# Patient Record
Sex: FEMALE | Race: WHITE | NOT HISPANIC OR LATINO | Employment: OTHER | URBAN - METROPOLITAN AREA
[De-identification: names, ages, dates, MRNs, and addresses within clinical notes are randomized per-mention and may not be internally consistent; named-entity substitution may affect disease eponyms.]

---

## 2017-01-02 DIAGNOSIS — Z78.0 MENOPAUSE: ICD-10-CM

## 2017-01-02 DIAGNOSIS — M85.80 OSTEOPENIA: ICD-10-CM

## 2017-01-02 DIAGNOSIS — R53.83 FATIGUE: ICD-10-CM

## 2017-01-02 DIAGNOSIS — E03.9 HYPOTHYROIDISM: Primary | ICD-10-CM

## 2017-01-05 DIAGNOSIS — Z78.0 MENOPAUSE: ICD-10-CM

## 2017-01-05 DIAGNOSIS — E03.9 HYPOTHYROIDISM: ICD-10-CM

## 2017-01-05 DIAGNOSIS — M85.80 OSTEOPENIA: ICD-10-CM

## 2017-01-05 DIAGNOSIS — R53.83 FATIGUE: ICD-10-CM

## 2017-01-05 LAB
DEPRECATED CALCIDIOL+CALCIFEROL SERPL-MC: 56 UG/L (ref 20–75)
DHEA-S SERPL-MCNC: 372 UG/DL (ref 35–430)
ESTRADIOL SERPL-MCNC: 68 PG/ML
GLUCOSE SERPL-MCNC: 86 MG/DL (ref 70–99)
PROGEST SERPL-MCNC: 13.6 NG/ML
T3FREE SERPL-MCNC: 4.6 PG/ML (ref 2.3–4.2)
T4 FREE SERPL-MCNC: 1.02 NG/DL (ref 0.76–1.46)
TSH SERPL DL<=0.05 MIU/L-ACNC: 0.06 MU/L (ref 0.4–4)

## 2017-01-05 PROCEDURE — 84439 ASSAY OF FREE THYROXINE: CPT | Mod: 90 | Performed by: SURGERY

## 2017-01-05 PROCEDURE — 82947 ASSAY GLUCOSE BLOOD QUANT: CPT | Mod: 90 | Performed by: SURGERY

## 2017-01-05 PROCEDURE — 84144 ASSAY OF PROGESTERONE: CPT | Mod: 90 | Performed by: SURGERY

## 2017-01-05 PROCEDURE — 84270 ASSAY OF SEX HORMONE GLOBUL: CPT | Mod: 90 | Performed by: SURGERY

## 2017-01-05 PROCEDURE — 84403 ASSAY OF TOTAL TESTOSTERONE: CPT | Mod: 90 | Performed by: SURGERY

## 2017-01-05 PROCEDURE — 82306 VITAMIN D 25 HYDROXY: CPT | Mod: 90 | Performed by: SURGERY

## 2017-01-05 PROCEDURE — 82670 ASSAY OF TOTAL ESTRADIOL: CPT | Mod: 90 | Performed by: SURGERY

## 2017-01-05 PROCEDURE — 84443 ASSAY THYROID STIM HORMONE: CPT | Mod: 90 | Performed by: SURGERY

## 2017-01-05 PROCEDURE — 84481 FREE ASSAY (FT-3): CPT | Mod: 90 | Performed by: SURGERY

## 2017-01-05 PROCEDURE — 82627 DEHYDROEPIANDROSTERONE: CPT | Mod: 90 | Performed by: SURGERY

## 2017-01-05 PROCEDURE — 36415 COLL VENOUS BLD VENIPUNCTURE: CPT | Performed by: SURGERY

## 2017-01-05 PROCEDURE — 99000 SPECIMEN HANDLING OFFICE-LAB: CPT | Performed by: SURGERY

## 2017-01-08 LAB
SHBG SERPL-SCNC: 103 NMOL/L (ref 30–135)
TESTOST FREE SERPL-MCNC: 1.21 NG/DL (ref 0.06–0.38)
TESTOST SERPL-MCNC: 151 NG/DL (ref 8–60)

## 2017-02-18 DIAGNOSIS — Z78.0 MENOPAUSE: ICD-10-CM

## 2017-02-18 DIAGNOSIS — E03.9 HYPOTHYROIDISM: Primary | ICD-10-CM

## 2017-02-18 DIAGNOSIS — Q78.2 OSTEOPETROSIS: ICD-10-CM

## 2017-02-18 DIAGNOSIS — R53.83 FATIGUE: ICD-10-CM

## 2017-02-18 LAB
ESTRADIOL SERPL-MCNC: 174 PG/ML
PROGEST SERPL-MCNC: 23.5 NG/ML
T3FREE SERPL-MCNC: 4.8 PG/ML (ref 2.3–4.2)
T4 FREE SERPL-MCNC: 0.91 NG/DL (ref 0.76–1.46)
TSH SERPL DL<=0.05 MIU/L-ACNC: 0.16 MU/L (ref 0.4–4)

## 2017-02-18 PROCEDURE — 84403 ASSAY OF TOTAL TESTOSTERONE: CPT | Performed by: SURGERY

## 2017-02-18 PROCEDURE — 36415 COLL VENOUS BLD VENIPUNCTURE: CPT | Performed by: SURGERY

## 2017-02-18 PROCEDURE — 84144 ASSAY OF PROGESTERONE: CPT | Performed by: SURGERY

## 2017-02-18 PROCEDURE — 84481 FREE ASSAY (FT-3): CPT | Performed by: SURGERY

## 2017-02-18 PROCEDURE — 82627 DEHYDROEPIANDROSTERONE: CPT | Performed by: SURGERY

## 2017-02-18 PROCEDURE — 84270 ASSAY OF SEX HORMONE GLOBUL: CPT | Performed by: SURGERY

## 2017-02-18 PROCEDURE — 84443 ASSAY THYROID STIM HORMONE: CPT | Performed by: SURGERY

## 2017-02-18 PROCEDURE — 84439 ASSAY OF FREE THYROXINE: CPT | Performed by: SURGERY

## 2017-02-18 PROCEDURE — 82670 ASSAY OF TOTAL ESTRADIOL: CPT | Performed by: SURGERY

## 2017-02-20 LAB — DHEA-S SERPL-MCNC: 331 UG/DL (ref 35–430)

## 2017-02-21 LAB
SHBG SERPL-SCNC: 81 NMOL/L (ref 30–135)
TESTOST FREE SERPL-MCNC: 2.05 NG/DL (ref 0.06–0.38)
TESTOST SERPL-MCNC: 205 NG/DL (ref 8–60)

## 2017-03-29 ENCOUNTER — HOSPITAL ENCOUNTER (OUTPATIENT)
Dept: ULTRASOUND IMAGING | Facility: CLINIC | Age: 67
Discharge: HOME | End: 2017-03-29
Attending: SURGERY | Admitting: SURGERY
Payer: MEDICARE

## 2017-03-29 DIAGNOSIS — R10.11 ABDOMINAL WALL PAIN IN RIGHT UPPER QUADRANT: ICD-10-CM

## 2017-03-29 DIAGNOSIS — E03.9 HYPOTHYROIDISM: ICD-10-CM

## 2017-03-29 DIAGNOSIS — N95.1 MENOPAUSAL SYNDROME (HOT FLASHES): Primary | ICD-10-CM

## 2017-03-29 LAB
T3FREE SERPL-MCNC: 4.2 PG/ML (ref 2.3–4.2)
T4 FREE SERPL-MCNC: 0.98 NG/DL (ref 0.76–1.46)
TSH SERPL DL<=0.05 MIU/L-ACNC: 0.02 MU/L (ref 0.4–4)

## 2017-03-29 PROCEDURE — 84439 ASSAY OF FREE THYROXINE: CPT | Performed by: SURGERY

## 2017-03-29 PROCEDURE — 84270 ASSAY OF SEX HORMONE GLOBUL: CPT | Performed by: SURGERY

## 2017-03-29 PROCEDURE — 84443 ASSAY THYROID STIM HORMONE: CPT | Performed by: SURGERY

## 2017-03-29 PROCEDURE — 36415 COLL VENOUS BLD VENIPUNCTURE: CPT | Performed by: SURGERY

## 2017-03-29 PROCEDURE — 84481 FREE ASSAY (FT-3): CPT | Performed by: SURGERY

## 2017-03-29 PROCEDURE — 84403 ASSAY OF TOTAL TESTOSTERONE: CPT | Performed by: SURGERY

## 2017-03-29 PROCEDURE — 76705 ECHO EXAM OF ABDOMEN: CPT

## 2017-03-31 LAB
SHBG SERPL-SCNC: 75 NMOL/L (ref 30–135)
TESTOST FREE SERPL-MCNC: 0.2 NG/DL (ref 0.06–0.38)
TESTOST SERPL-MCNC: 22 NG/DL (ref 8–60)

## 2017-06-22 DIAGNOSIS — Q78.2 OSTEOPETROSIS: ICD-10-CM

## 2017-06-22 DIAGNOSIS — E03.9 HYPOTHYROIDISM: ICD-10-CM

## 2017-06-22 DIAGNOSIS — R53.83 FATIGUE: ICD-10-CM

## 2017-06-22 DIAGNOSIS — Z78.0 MENOPAUSE: ICD-10-CM

## 2017-06-22 DIAGNOSIS — R52 PAIN: Primary | ICD-10-CM

## 2017-06-22 LAB
CRP SERPL-MCNC: <2.9 MG/L (ref 0–8)
ERYTHROCYTE [SEDIMENTATION RATE] IN BLOOD BY WESTERGREN METHOD: 8 MM/H (ref 0–30)
GLUCOSE SERPL-MCNC: 91 MG/DL (ref 70–99)

## 2017-06-22 PROCEDURE — 85652 RBC SED RATE AUTOMATED: CPT | Performed by: SURGERY

## 2017-06-22 PROCEDURE — 84270 ASSAY OF SEX HORMONE GLOBUL: CPT | Performed by: SURGERY

## 2017-06-22 PROCEDURE — 84403 ASSAY OF TOTAL TESTOSTERONE: CPT | Performed by: SURGERY

## 2017-06-22 PROCEDURE — 82947 ASSAY GLUCOSE BLOOD QUANT: CPT | Performed by: SURGERY

## 2017-06-22 PROCEDURE — 36415 COLL VENOUS BLD VENIPUNCTURE: CPT | Performed by: SURGERY

## 2017-06-22 PROCEDURE — 86140 C-REACTIVE PROTEIN: CPT | Performed by: SURGERY

## 2017-06-28 LAB
SHBG SERPL-SCNC: 70 NMOL/L (ref 30–135)
TESTOST FREE SERPL-MCNC: 3.6 NG/DL (ref 0.06–0.38)
TESTOST SERPL-MCNC: 310 NG/DL (ref 8–60)

## 2017-08-08 DIAGNOSIS — Z78.0 MENOPAUSE: ICD-10-CM

## 2017-08-08 DIAGNOSIS — R52 PAIN MANAGEMENT: Primary | ICD-10-CM

## 2017-08-08 PROCEDURE — 36415 COLL VENOUS BLD VENIPUNCTURE: CPT | Performed by: SURGERY

## 2017-08-08 PROCEDURE — 84403 ASSAY OF TOTAL TESTOSTERONE: CPT | Performed by: SURGERY

## 2017-08-08 PROCEDURE — 84270 ASSAY OF SEX HORMONE GLOBUL: CPT | Performed by: SURGERY

## 2017-08-09 LAB
SHBG SERPL-SCNC: 57 NMOL/L (ref 30–135)
TESTOST FREE SERPL-MCNC: 0.38 NG/DL (ref 0.06–0.38)
TESTOST SERPL-MCNC: 31 NG/DL (ref 8–60)

## 2017-08-14 DIAGNOSIS — N95.1 MENOPAUSAL SYNDROME (HOT FLASHES): Primary | ICD-10-CM

## 2017-08-15 ENCOUNTER — OFFICE VISIT (OUTPATIENT)
Dept: OBGYN | Facility: CLINIC | Age: 67
End: 2017-08-15
Payer: COMMERCIAL

## 2017-08-15 VITALS
HEIGHT: 65 IN | WEIGHT: 150.2 LBS | DIASTOLIC BLOOD PRESSURE: 54 MMHG | HEART RATE: 86 BPM | SYSTOLIC BLOOD PRESSURE: 100 MMHG | BODY MASS INDEX: 25.02 KG/M2 | TEMPERATURE: 98.4 F

## 2017-08-15 DIAGNOSIS — Z00.00 ROUTINE GENERAL MEDICAL EXAMINATION AT A HEALTH CARE FACILITY: Primary | ICD-10-CM

## 2017-08-15 DIAGNOSIS — Z79.890 ON HORMONE REPLACEMENT THERAPY: ICD-10-CM

## 2017-08-15 DIAGNOSIS — Z12.31 ENCOUNTER FOR SCREENING MAMMOGRAM FOR BREAST CANCER: ICD-10-CM

## 2017-08-15 DIAGNOSIS — Z92.89 PERSONAL HISTORY OF OTHER MEDICAL TREATMENT: ICD-10-CM

## 2017-08-15 DIAGNOSIS — Z11.3 SCREEN FOR STD (SEXUALLY TRANSMITTED DISEASE): ICD-10-CM

## 2017-08-15 PROCEDURE — 99387 INIT PM E/M NEW PAT 65+ YRS: CPT | Performed by: OBSTETRICS & GYNECOLOGY

## 2017-08-15 PROCEDURE — G0145 SCR C/V CYTO,THINLAYER,RESCR: HCPCS | Performed by: OBSTETRICS & GYNECOLOGY

## 2017-08-15 PROCEDURE — 87491 CHLMYD TRACH DNA AMP PROBE: CPT | Performed by: OBSTETRICS & GYNECOLOGY

## 2017-08-15 PROCEDURE — G0476 HPV COMBO ASSAY CA SCREEN: HCPCS | Performed by: OBSTETRICS & GYNECOLOGY

## 2017-08-15 PROCEDURE — 87591 N.GONORRHOEAE DNA AMP PROB: CPT | Performed by: OBSTETRICS & GYNECOLOGY

## 2017-08-15 PROCEDURE — 99213 OFFICE O/P EST LOW 20 MIN: CPT | Mod: 25 | Performed by: OBSTETRICS & GYNECOLOGY

## 2017-08-15 NOTE — MR AVS SNAPSHOT
After Visit Summary   8/15/2017    Arun Aguilar    MRN: [id number]           Patient Information     Date Of Birth          1950        Visit Information        Provider Department      8/15/2017 2:15 PM Rcahele Jeff MD [facility]        Today's Diagnoses     Routine general medical examination at a health care facility    -  1    Screen for STD (sexually transmitted disease)        Encounter for screening mammogram for breast cancer        Personal history of other medical treatment         On hormone replacement therapy          Care Instructions      Preventive Health Recommendations  Female Ages 65 +    Yearly exam:     See your health care provider every year in order to  o Review health changes.   o Discuss preventive care.    o Review your medicines if your doctor has prescribed any.      You no longer need a yearly Pap test unless you've had an abnormal Pap test in the past 10 years. If you have vaginal symptoms, such as bleeding or discharge, be sure to talk with your provider about a Pap test.      Every 1 to 2 years, have a mammogram.  If you are over 69, talk with your health care provider about whether or not you want to continue having screening mammograms.      Every 10 years, have a colonoscopy. Or, have a yearly FIT test (stool test). These exams will check for colon cancer.       Have a cholesterol test every 5 years, or more often if your doctor advises it.       Have a diabetes test (fasting glucose) every three years. If you are at risk for diabetes, you should have this test more often.       At age 65, have a bone density scan (DEXA) to check for osteoporosis (brittle bone disease).    Shots:    Get a flu shot each year.    Get a tetanus shot every 10 years.    Talk to your doctor about your pneumonia vaccines. There are now two you should receive - Pneumovax (PPSV 23) and Prevnar (PCV 13).    Talk to your doctor about the shingles vaccine.    Talk  "to your doctor about the hepatitis B vaccine.    Nutrition:     Eat at least 5 servings of fruits and vegetables each day.      Eat whole-grain bread, whole-wheat pasta and brown rice instead of white grains and rice.      Talk to your provider about Calcium and Vitamin D.     Lifestyle    Exercise at least 150 minutes a week (30 minutes a day, 5 days a week). This will help you control your weight and prevent disease.      Limit alcohol to one drink per day.      No smoking.       Wear sunscreen to prevent skin cancer.       See your dentist twice a year for an exam and cleaning.      See your eye doctor every 1 to 2 years to screen for conditions such as glaucoma, macular degeneration, cataracts, etc           Follow-ups after your visit        Your next 10 appointments already scheduled     Sep 15, 2017 11:20 AM CDT   [facility] Vale Prescott [facility]-C   [facility]    [address] [address]   [phone]            Sep 15, 2017 12:30 PM CDT   MA SCREENING DIGITAL BILATERAL with [address] [facility] ([facility])    [address] [address]   [phone]           Do not use any powder, lotion or deodorant under your arms or on your breast. If you do, we will ask you to remove it before your exam.  Wear comfortable, two-piece clothing.  If you have any allergies, tell your care team.  Bring any previous mammograms from other facilities or have them mailed to the breast center. Three-dimensional (3D) mammograms are available at [facility] in [facility], and [place]. [facility] locations include [place] [facility] in [place]. Benefits of 3D mammograms include: - Improved rate of cancer detection - Decreases your chance of having to go back for more tests, which means fewer: - \"False-positive\" results (This means that there " "is an abnormal area but it isn't cancer.) - Invasive testing procedures, such as a biopsy or surgery - Can provide clearer images of the breast if you have dense breast tissue. 3D mammography is an optional exam that anyone can have with a 2D mammogram. It doesn't replace or take the place of a 2D mammogram. 2D mammograms remain an effective screening test for all women.  Not all insurance companies cover the cost of a 3D mammogram. Check with your insurance.              Who to contact     If you have questions or need follow up information about today's clinic visit or your schedule please contact [facility] directly at [phone].  Normal or non-critical lab and imaging results will be communicated to you by JumpSeathart, letter or phone within 4 business days after the clinic has received the results. If you do not hear from us within 7 days, please contact the clinic through JumpSeathart or phone. If you have a critical or abnormal lab result, we will notify you by phone as soon as possible.  Submit refill requests through MPOWER Mobile or call your pharmacy and they will forward the refill request to us. Please allow 3 business days for your refill to be completed.          Additional Information About Your Visit        [email or web address] Information     MPOWER Mobile lets you send messages to your doctor, view your test results, renew your prescriptions, schedule appointments and more. To sign up, go to [email or web address] . Click on \"Log in\" on the left side of the screen, which will take you to the Welcome page. Then click on \"Sign up Now\" on the right side of the page.     You will be asked to enter the access code listed below, as well as some personal information. Please follow the directions to create your username and password.     Your access code is: [email or web address]  Expires: 11/15/2017  3:54 PM     Your access code will  in 90 days. If you need help or a new code, please call your [facility] or " "[phone].        Care EveryWhere ID     This is your Care EveryWhere ID. This could be used by other organizations to access your [place] medical records  [id number]        Your Vitals Were     Pulse Temperature Height Breastfeeding? BMI (Body Mass Index)       86 98.4  F (36.9  C) (Oral) 5' 4.75\" (1.645 m) No 25.19 kg/m2        Blood Pressure from Last 3 Encounters:   08/15/17 100/54    Weight from Last 3 Encounters:   08/15/17 150 lb 3.2 oz (68.1 kg)              We Performed the Following     Chlamydia trachomatis PCR     HPV High Risk Types DNA Cervical     Neisseria gonorrhoeae PCR     Pap imaged thin layer screen with HPV - recommended age 30 - 65 years (select HPV order below)        Primary Care Provider Office Phone # Fax #    Zeus Lopez [phone]       [place] [address]        Equal Access to Services     TRISTAN PEDERSEN AH: Hadii elida ruano hadasho Soomaali, waaxda luqadaha, qaybta kaalmada adeegyada, waxay orlyin hayivannan bettie watt . So [facility] [phone].    ATENCIÓN: Si habla español, tiene a de jesus disposición servicios gratuitos de asistencia lingüística. Llame al [phone].    We comply with applicable federal civil rights laws and [place] laws. We do not discriminate on the basis of race, color, national origin, age, disability sex, sexual orientation or gender identity.            Thank you!     Thank you for choosing [facility]  for your care. Our goal is always to provide you with excellent care. Hearing back from our patients is one way we can continue to improve our services. Please take a few minutes to complete the written survey that you may receive in the mail after your visit with us. Thank you!             Your Updated Medication List - Protect others around you: Learn how to safely use, store and throw away your medicines at www.disposemymeds.org.      Notice  As of 8/15/2017 11:59 PM    You have not " been prescribed any medications.
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
## 2017-08-15 NOTE — LETTER
August 22, 2017      Arun Aguilar  [address] [place] [address]    Dear ,      I am happy to inform you that your cervical cancer screening test (PAP smear) was normal and your Human Papillomavirus (HPV) test was negative.    Per current guidelines, you no longer need to have pap smears completed.     Please continue to be seen every year for an annual wellness visit and other preventative tests.     Please contact my office at [phone] if you have further questions.    Sincerely,      Rachele Jeff MD/azalia

## 2017-08-16 LAB
C TRACH DNA SPEC QL NAA+PROBE: NEGATIVE
N GONORRHOEA DNA SPEC QL NAA+PROBE: NEGATIVE
SPECIMEN SOURCE: NORMAL
SPECIMEN SOURCE: NORMAL

## 2017-08-17 DIAGNOSIS — Z00.00 ROUTINE GENERAL MEDICAL EXAMINATION AT A HEALTH CARE FACILITY: Primary | ICD-10-CM

## 2017-08-17 LAB
COPATH REPORT: NORMAL
PAP: NORMAL

## 2017-08-17 PROCEDURE — 99207 ZZC NO CHARGE LOS: CPT | Performed by: OBSTETRICS & GYNECOLOGY

## 2017-08-17 RX ORDER — TYROSINE 500 MG
500 TABLET ORAL DAILY
COMMUNITY

## 2017-08-17 RX ORDER — VITAMIN K2 40 MCG
100 TABLET ORAL DAILY
COMMUNITY

## 2017-08-17 RX ORDER — ASCORBIC ACID 500 MG
500 TABLET ORAL DAILY
COMMUNITY

## 2017-08-17 RX ORDER — CRANBERRY FRUIT EXTRACT 650 MG
2 CAPSULE ORAL DAILY
COMMUNITY

## 2017-08-17 RX ORDER — VITAMIN B COMPLEX
50 TABLET ORAL DAILY
COMMUNITY

## 2017-08-17 RX ORDER — LANOLIN ALCOHOL/MO/W.PET/CERES
3 CREAM (GRAM) TOPICAL DAILY
COMMUNITY

## 2017-08-17 RX ORDER — PROGESTERONE, MICRONIZED 100 %
100 POWDER (GRAM) MISCELLANEOUS DAILY
COMMUNITY

## 2017-08-21 LAB
FINAL DIAGNOSIS: NORMAL
HPV HR 12 DNA CVX QL NAA+PROBE: NEGATIVE
HPV16 DNA SPEC QL NAA+PROBE: NEGATIVE
HPV18 DNA SPEC QL NAA+PROBE: NEGATIVE
SPECIMEN DESCRIPTION: NORMAL

## 2017-08-22 PROBLEM — Z12.4 CERVICAL CANCER SCREENING: Status: ACTIVE | Noted: 2017-08-22

## 2017-09-15 ENCOUNTER — OFFICE VISIT (OUTPATIENT)
Dept: DERMATOLOGY | Facility: CLINIC | Age: 67
End: 2017-09-15
Payer: COMMERCIAL

## 2017-09-15 ENCOUNTER — HOSPITAL ENCOUNTER (OUTPATIENT)
Dept: MAMMOGRAPHY | Facility: CLINIC | Age: 67
Discharge: HOME | End: 2017-09-15
Attending: OBSTETRICS & GYNECOLOGY | Admitting: OBSTETRICS & GYNECOLOGY
Payer: MEDICARE

## 2017-09-15 VITALS — OXYGEN SATURATION: 98 % | SYSTOLIC BLOOD PRESSURE: 139 MMHG | DIASTOLIC BLOOD PRESSURE: 55 MMHG | HEART RATE: 74 BPM

## 2017-09-15 DIAGNOSIS — Z12.31 ENCOUNTER FOR SCREENING MAMMOGRAM FOR BREAST CANCER: ICD-10-CM

## 2017-09-15 DIAGNOSIS — L82.1 SEBORRHEIC KERATOSIS: ICD-10-CM

## 2017-09-15 DIAGNOSIS — L72.9 CYST OF SKIN: Primary | ICD-10-CM

## 2017-09-15 DIAGNOSIS — L81.4 LENTIGO: ICD-10-CM

## 2017-09-15 DIAGNOSIS — D18.01 CHERRY ANGIOMA: ICD-10-CM

## 2017-09-15 DIAGNOSIS — L72.0 MILIA: ICD-10-CM

## 2017-09-15 DIAGNOSIS — L30.9 DERMATITIS: ICD-10-CM

## 2017-09-15 PROCEDURE — G0202 SCR MAMMO BI INCL CAD: HCPCS

## 2017-09-15 PROCEDURE — 99203 OFFICE O/P NEW LOW 30 MIN: CPT | Performed by: PHYSICIAN ASSISTANT

## 2017-09-15 RX ORDER — CLINDAMYCIN PHOSPHATE 10 MG/G
GEL TOPICAL 2 TIMES DAILY
Qty: 60 G | Refills: 11 | Status: SHIPPED | OUTPATIENT
Start: 2017-09-15

## 2017-09-15 NOTE — PROGRESS NOTES
Arun Aguilar is a 67 year old year old female patient here today for bumps on labia majora and skin check.  Patient reports that bumps have been present a few weeks after her hernia surgery. She denies any pain or bleeding. She also notes on inflamed cyst will come and go on her right buttock. She reports she also get a few different rashes. She notes a rash that will appear on her hands during the spring but then will resolve in a few weeks. She has a few healing areas on hands today.  She reports she will occasionally get a rash around her mouth which is clear today and reports that avoiding gluten has helped.  Patient has no other skin complaints today.  Remainder of the HPI, Meds, PMH, Allergies, FH, and SH was reviewed in chart.    Pertinent Hx:  No personal history of skin cancer   History reviewed. No pertinent past medical history.    History reviewed. No pertinent surgical history.     History reviewed. No pertinent family history.    Social History     Social History     Marital status: Single     Spouse name: N/A     Number of children: N/A     Years of education: N/A     Occupational History     Not on file.     Social History Main Topics     Smoking status: Not on file     Smokeless tobacco: Not on file     Alcohol use Not on file     Drug use: Not on file     Sexual activity: Not on file     Other Topics Concern     Not on file     Social History Narrative     No narrative on file       Outpatient Encounter Prescriptions as of 9/15/2017   Medication Sig Dispense Refill     clindamycin (CLINDAMAX) 1 % topical gel Apply topically 2 times daily 60 g 11     Calcium Carb-Ergocalciferol 500-200 MG-UNIT TABS Take 1 tablet by mouth daily       Fish Oil-Cholecalciferol (FISH OIL + D3) 0538-3708 MG-UNIT CAPS Take 500 mg by mouth daily       ascorbic acid (VITAMIN C) 500 MG tablet Take 500 mg by mouth daily       Cholecalciferol (VITAMIN D3) 1000 UNITS CAPS Take 1,000 Units by mouth daily       cyanocobalamin  (RA VITAMIN B-12 TR) 1000 MCG TBCR Take 1,000 mcg by mouth daily       Probiotic Product (PROBIOTIC & ACIDOPHILUS EX ST PO) Take 76 mg by mouth daily       Menaquinone-7 (VITAMIN K2) 40 MCG TABS Take 100 mcg by mouth daily       l-tyrosine 500 MG TABS Take 500 mg by mouth daily       B Complex Vitamins (VITAMIN-B COMPLEX) TABS Take 50 mg by mouth daily       SELENIUM PO Take 100 mcg by mouth daily       Progesterone Micronized (PROGESTERONE, BULK,) POWD powder Take 100 mg by mouth daily       progesterone (PROMETRIUM) 200 MG capsule Take 200 mg by mouth daily       DHEA 25 MG CAPS Take 2 mg by mouth daily       melatonin 3 MG tablet Take 3 mg by mouth daily       GLUCOSAMINE SULFATE PO Take 500 mg by mouth daily       No facility-administered encounter medications on file as of 9/15/2017.              Review Of Systems  Skin: As above  Eyes: negative  Ears/Nose/Throat: negative  Respiratory: No shortness of breath, dyspnea on exertion, cough, or hemoptysis  Cardiovascular: negative  Gastrointestinal: negative  Genitourinary: negative  Musculoskeletal: negative  Neurologic: negative  Psychiatric: negative  Hematologic/Lymphatic/Immunologic: negative  Endocrine: negative      O:   NAD, WDWN, Alert & Oriented, Mood & Affect wnl, Vitals stable   Here today alone   /55  Pulse 74  SpO2 98%   General appearance normal   Vitals stable   Alert, oriented and in no acute distress      White small papules < 2 mm in size on labia majora   Inflamed healing subcutaneous nodule on left buttock that is healing   Brown stuck on papules, brown macules and red papules on torso and extremities    No visible rash on face   Healing pink macules on right hand, fingers      The remainder of the detailed exam was unremarkable; the following areas were examined:  scalp/hair, conjunctiva/lids, face, neck, lips/teeth, oral mucosa/gingiva, chest, back, abdomen, buttocks, digits/nails, RUE, LUE, RLE, LLE.      Eyes:  Conjunctivae/lids:Normal     ENT: Lips    MSK:Normal    Cardiovascular: peripheral edema none    Pulm: Breathing Normal    Neuro/Psych: Orientation:Normal; Mood/Affect:Normal  A/P:  1. Milia on Labia Majora  Discussed can use an exfoliating wash to see if this will help prevent further areas from developing.   Informational handout was given.  2. Healing inflamed cyst on left buttock  Patient reports that she occasionally will get out drainage.  Can try clindamycin gel. Use warm compresses.   3. Dermatitis on hand and mouth  Please return when rash is present for further evaluation and possibly biopsy.  4. Seborrheic keratosis, lentigo, cherry angioma   BENIGN LESIONS DISCUSSED WITH PATIENT:  I discussed the specifics of tumor, prognosis, and genetics of benign lesions.  I explained that treatment of these lesions would be purely cosmetic and not medically neccessary.  I discussed with patient different removal options including excision, cautery and /or laser.      Nature and genetics of benign skin lesions dicussed with patient.  Signs and Symptoms of skin cancer discussed with patient.  ABCDEs of melanoma reviewed with patient.  Patient encouraged to perform monthly skin exams.  UV precautions reviewed with patient.  Risks of non-melanoma skin cancer discussed with patient   Return to clinic one year or sooner if needed.

## 2017-09-15 NOTE — MR AVS SNAPSHOT
"              After Visit Summary   9/15/2017    Arun Aguilar    MRN: [id number]           Patient Information     Date Of Birth          1950        Visit Information        Provider Department      9/15/2017 11:20 AM Vale Prescott PA-C [facility]        Today's Diagnoses     Cyst of skin    -  1    Milia        Lentigo        Seborrheic keratosis        Cherry angioma        Dermatitis           Follow-ups after your visit        Who to contact     If you have questions or need follow up information about today's clinic visit or your schedule please contact [facility] directly at [phone].  Normal or non-critical lab and imaging results will be communicated to you by "[employer]hart, letter or phone within 4 business days after the clinic has received the results. If you do not hear from us within 7 days, please contact the clinic through "[employer]hart or phone. If you have a critical or abnormal lab result, we will notify you by phone as soon as possible.  Submit refill requests through [employer] or call your pharmacy and they will forward the refill request to us. Please allow 3 business days for your refill to be completed.          Additional Information About Your Visit        MyChart Information     [employer] lets you send messages to your doctor, view your test results, renew your prescriptions, schedule appointments and more. To sign up, go to [email or web address] . Click on \"Log in\" on the left side of the screen, which will take you to the Welcome page. Then click on \"Sign up Now\" on the right side of the page.     You will be asked to enter the access code listed below, as well as some personal information. Please follow the directions to create your username and password.     Your access code is: [email or web address]  Expires: 11/15/2017  3:54 PM     Your access code will  in 90 days. If you need help or a new code, please call your [facility] or [phone].   "      Care EveryWhere ID     This is your Care EveryWhere ID. This could be used by other organizations to access your [place] [facility] medical records  [id number]        Your Vitals Were     Pulse Pulse Oximetry                74 98%           Blood Pressure from Last 3 Encounters:   09/15/17 139/55   08/15/17 100/54    Weight from Last 3 Encounters:   08/15/17 68.1 kg (150 lb 3.2 oz)              Today, you had the following     No orders found for display         Today's Medication Changes          These changes are accurate as of: 9/15/17 11:59 PM.  If you have any questions, ask your nurse or doctor.               Start taking these medicines.        Dose/Directions    clindamycin 1 % topical gel   Commonly known as:  CLINDAMAX   Used for:  Cyst of skin   Started by:  Vale Prescott PA-C        Apply topically 2 times daily   Quantity:  60 g   Refills:  11            Where to get your medicines      These medications were sent to [facility] - [place], [place] - [address]  [address] , [facility] [address]     Phone:  [phone]     clindamycin 1 % topical gel                Primary Care Provider Office Phone # Fax #    Zeus Lopez [phone]       [place] [address]        Equal Access to Services     TRISTAN PEDERSEN AH: Hadii elida ruano hadasho Soomaali, waaxda luqadaha, qaybta kaalmada adeegyada, waxruslan urena. So [facility] [phone].    ATENCIÓN: Si habla español, tiene a de jesus disposición servicios gratuitos de asistencia lingüística. Llame al [phone].    We comply with applicable federal civil rights laws and [place] laws. We do not discriminate on the basis of race, color, national origin, age, disability sex, sexual orientation or gender identity.            Thank you!     Thank you for choosing [facility]  for your care. Our goal is always to provide you with excellent care. Hearing  back from our patients is one way we can continue to improve our services. Please take a few minutes to complete the written survey that you may receive in the mail after your visit with us. Thank you!             Your Updated Medication List - Protect others around you: Learn how to safely use, store and throw away your medicines at www.disposemymeds.org.          This list is accurate as of: 9/15/17 11:59 PM.  Always use your most recent med list.                   Brand Name Dispense Instructions for use Diagnosis    ascorbic acid 500 MG tablet    VITAMIN C     Take 500 mg by mouth daily        Calcium Carb-Ergocalciferol 500-200 MG-UNIT Tabs      Take 1 tablet by mouth daily        clindamycin 1 % topical gel    CLINDAMAX    60 g    Apply topically 2 times daily    Cyst of skin       DHEA 25 MG Caps      Take 2 mg by mouth daily        FISH OIL + D3 2063-8799 MG-UNIT Caps      Take 500 mg by mouth daily        GLUCOSAMINE SULFATE PO      Take 500 mg by mouth daily        l-tyrosine 500 MG Tabs      Take 500 mg by mouth daily        melatonin 3 MG tablet      Take 3 mg by mouth daily        PROBIOTIC & ACIDOPHILUS EX ST PO      Take 76 mg by mouth daily        progesterone (bulk) Powd powder      Take 100 mg by mouth daily        progesterone 200 MG capsule    PROMETRIUM     Take 200 mg by mouth daily        RA VITAMIN B-12 TR 1000 MCG Tbcr   Generic drug:  cyanocobalamin      Take 1,000 mcg by mouth daily        SELENIUM PO      Take 100 mcg by mouth daily        vitamin D3 1000 UNITS Caps      Take 1,000 Units by mouth daily        Vitamin K2 40 MCG Tabs      Take 100 mcg by mouth daily        Vitamin-B Complex Tabs      Take 50 mg by mouth daily

## 2017-09-15 NOTE — NURSING NOTE
"Initial /55  Pulse 74  SpO2 98% Estimated body mass index is 25.19 kg/(m^2) as calculated from the following:    Height as of 8/15/17: 1.645 m (5' 4.75\").    Weight as of 8/15/17: 68.1 kg (150 lb 3.2 oz). .      "

## 2017-09-25 DIAGNOSIS — R52 PAIN MANAGEMENT: ICD-10-CM

## 2017-09-25 DIAGNOSIS — R53.83 FATIGUE: ICD-10-CM

## 2017-09-25 DIAGNOSIS — Z78.0 MENOPAUSE: ICD-10-CM

## 2017-09-25 DIAGNOSIS — E03.9 HYPOTHYROIDISM: ICD-10-CM

## 2017-09-25 DIAGNOSIS — E03.9 HYPOTHYROIDISM: Primary | ICD-10-CM

## 2017-09-25 LAB
CORTIS SERPL-MCNC: 21.1 UG/DL (ref 4–22)
CORTIS SERPL-MCNC: 7.1 UG/DL (ref 4–22)
CRP SERPL-MCNC: <2.9 MG/L (ref 0–8)
DHEA-S SERPL-MCNC: 204 UG/DL (ref 35–430)
ERYTHROCYTE [SEDIMENTATION RATE] IN BLOOD BY WESTERGREN METHOD: 5 MM/H (ref 0–30)
ESTRADIOL SERPL-MCNC: 32 PG/ML
PROGEST SERPL-MCNC: 54.6 NG/ML
T3FREE SERPL-MCNC: 5.3 PG/ML (ref 2.3–4.2)
T4 FREE SERPL-MCNC: 0.92 NG/DL (ref 0.76–1.46)
TSH SERPL DL<=0.005 MIU/L-ACNC: 0.29 MU/L (ref 0.4–4)

## 2017-09-25 PROCEDURE — 84443 ASSAY THYROID STIM HORMONE: CPT | Performed by: SURGERY

## 2017-09-25 PROCEDURE — 82533 TOTAL CORTISOL: CPT | Mod: 59 | Performed by: SURGERY

## 2017-09-25 PROCEDURE — 84481 FREE ASSAY (FT-3): CPT | Performed by: SURGERY

## 2017-09-25 PROCEDURE — 86140 C-REACTIVE PROTEIN: CPT | Performed by: SURGERY

## 2017-09-25 PROCEDURE — 36415 COLL VENOUS BLD VENIPUNCTURE: CPT | Performed by: SURGERY

## 2017-09-25 PROCEDURE — 82627 DEHYDROEPIANDROSTERONE: CPT | Performed by: SURGERY

## 2017-09-25 PROCEDURE — 84144 ASSAY OF PROGESTERONE: CPT | Performed by: SURGERY

## 2017-09-25 PROCEDURE — 84439 ASSAY OF FREE THYROXINE: CPT | Performed by: SURGERY

## 2017-09-25 PROCEDURE — 82670 ASSAY OF TOTAL ESTRADIOL: CPT | Performed by: SURGERY

## 2017-09-25 PROCEDURE — 84270 ASSAY OF SEX HORMONE GLOBUL: CPT | Performed by: SURGERY

## 2017-09-25 PROCEDURE — 82533 TOTAL CORTISOL: CPT | Performed by: SURGERY

## 2017-09-25 PROCEDURE — 85652 RBC SED RATE AUTOMATED: CPT | Performed by: SURGERY

## 2017-09-25 PROCEDURE — 84403 ASSAY OF TOTAL TESTOSTERONE: CPT | Performed by: SURGERY

## 2017-09-27 LAB
SHBG SERPL-SCNC: 47 NMOL/L (ref 30–135)
TESTOST FREE SERPL-MCNC: 1.69 NG/DL (ref 0.06–0.38)
TESTOST SERPL-MCNC: 115 NG/DL (ref 8–60)

## 2017-11-06 DIAGNOSIS — N95.1 MENOPAUSAL SYNDROME (HOT FLASHES): ICD-10-CM

## 2017-11-06 PROCEDURE — 36415 COLL VENOUS BLD VENIPUNCTURE: CPT | Performed by: SURGERY

## 2017-11-06 PROCEDURE — 84144 ASSAY OF PROGESTERONE: CPT | Performed by: SURGERY

## 2017-11-07 LAB — PROGEST SERPL-MCNC: 9.1 NG/ML

## 2017-12-22 ENCOUNTER — TELEPHONE (OUTPATIENT)
Dept: OBGYN | Facility: CLINIC | Age: 67
End: 2017-12-22

## 2017-12-22 NOTE — TELEPHONE ENCOUNTER
"12/22/2017        Patient Communication Preferences indicate  Do not contact  and/or communication by \"Phone\" is not preferred. Call not required per Outreach team.      Outreach ,  Denny Dubose         "

## 2018-08-30 ENCOUNTER — TRANSFERRED RECORDS (OUTPATIENT)
Dept: HEALTH INFORMATION MANAGEMENT | Facility: CLINIC | Age: 68
End: 2018-08-30

## 2018-08-30 ENCOUNTER — MEDICAL CORRESPONDENCE (OUTPATIENT)
Dept: HEALTH INFORMATION MANAGEMENT | Facility: CLINIC | Age: 68
End: 2018-08-30

## 2018-09-11 ENCOUNTER — MEDICAL CORRESPONDENCE (OUTPATIENT)
Dept: HEALTH INFORMATION MANAGEMENT | Facility: CLINIC | Age: 68
End: 2018-09-11

## 2018-10-22 ENCOUNTER — HOSPITAL ENCOUNTER (OUTPATIENT)
Dept: BONE DENSITY | Facility: CLINIC | Age: 68
Discharge: HOME | End: 2018-10-22
Attending: SURGERY | Admitting: SURGERY
Payer: MEDICARE

## 2018-10-22 DIAGNOSIS — Z13.820 SCREENING FOR OSTEOPOROSIS: ICD-10-CM

## 2018-10-22 DIAGNOSIS — M85.88 OTHER SPECIFIED DISORDERS OF BONE DENSITY AND STRUCTURE, OTHER SITE: ICD-10-CM

## 2018-10-22 PROCEDURE — 77080 DXA BONE DENSITY AXIAL: CPT

## 2020-11-29 ENCOUNTER — RECORDS - HEALTHEAST (OUTPATIENT)
Dept: ADMINISTRATIVE | Facility: OTHER | Age: 70
End: 2020-11-29

## 2021-01-14 ENCOUNTER — HEALTH MAINTENANCE LETTER (OUTPATIENT)
Age: 71
End: 2021-01-14

## 2021-10-24 ENCOUNTER — HEALTH MAINTENANCE LETTER (OUTPATIENT)
Age: 71
End: 2021-10-24

## 2022-02-13 ENCOUNTER — HEALTH MAINTENANCE LETTER (OUTPATIENT)
Age: 72
End: 2022-02-13

## 2022-10-15 ENCOUNTER — HEALTH MAINTENANCE LETTER (OUTPATIENT)
Age: 72
End: 2022-10-15

## 2023-03-26 ENCOUNTER — HEALTH MAINTENANCE LETTER (OUTPATIENT)
Age: 73
End: 2023-03-26

## 2023-08-11 ENCOUNTER — HOSPITAL ENCOUNTER (OUTPATIENT)
Dept: BONE DENSITY | Facility: CLINIC | Age: 73
Discharge: HOME | End: 2023-08-11
Attending: SURGERY | Admitting: SURGERY
Payer: MEDICARE

## 2023-08-11 DIAGNOSIS — M85.88 OTHER SPECIFIED DISORDERS OF BONE DENSITY AND STRUCTURE, OTHER SITE: ICD-10-CM

## 2023-08-11 DIAGNOSIS — M85.80 OTHER SPECIFIED DISORDERS OF BONE DENSITY AND STRUCTURE, UNSPECIFIED SITE: ICD-10-CM

## 2023-08-11 PROCEDURE — 77080 DXA BONE DENSITY AXIAL: CPT

## 2023-08-17 ENCOUNTER — MEDICAL CORRESPONDENCE (OUTPATIENT)
Dept: HEALTH INFORMATION MANAGEMENT | Facility: CLINIC | Age: 73
End: 2023-08-17
Payer: MEDICARE

## 2023-08-17 ENCOUNTER — TRANSFERRED RECORDS (OUTPATIENT)
Dept: HEALTH INFORMATION MANAGEMENT | Facility: CLINIC | Age: 73
End: 2023-08-17
Payer: MEDICARE

## 2023-10-02 ENCOUNTER — OFFICE VISIT (OUTPATIENT)
Dept: DERMATOLOGY | Facility: CLINIC | Age: 73
End: 2023-10-02
Payer: MEDICARE

## 2023-10-02 DIAGNOSIS — D18.01 ANGIOMA OF SKIN: ICD-10-CM

## 2023-10-02 DIAGNOSIS — D23.9 DERMAL NEVUS: ICD-10-CM

## 2023-10-02 DIAGNOSIS — L82.1 SEBORRHEIC KERATOSES: Primary | ICD-10-CM

## 2023-10-02 DIAGNOSIS — L72.0 EPIDERMAL CYST: ICD-10-CM

## 2023-10-02 DIAGNOSIS — L81.4 LENTIGO: ICD-10-CM

## 2023-10-02 PROCEDURE — 99203 OFFICE O/P NEW LOW 30 MIN: CPT | Performed by: DERMATOLOGY

## 2023-10-02 NOTE — LETTER
10/2/2023         RE: Arun Aguilar  [address] [place] [address]        Dear Colleague,    Thank you for referring your patient, Arun Aguilar, to the [facility]. Please see a copy of my visit note below.    Arun Aguilar is an extremely pleasant 73 year old year old female patient I was asked to see by Dr. Lopez for spots on skin and face.  Patient has no other skin complaints today.  Remainder of the HPI, Meds, PMH, Allergies, FH, and SH was reviewed in chart.    No past medical history on file.    No past surgical history on file.     Family History   Problem Relation Age of Onset     No Known Problems Mother      No Known Problems Father      No Known Problems Sister      No Known Problems Daughter      No Known Problems Maternal Grandmother      No Known Problems Maternal Grandfather      No Known Problems Paternal Grandmother      No Known Problems Paternal Grandfather      No Known Problems Maternal Aunt      No Known Problems Paternal Aunt      Hereditary Breast and Ovarian Cancer Syndrome No family hx of      Breast Cancer No family hx of      Cancer No family hx of      Colon Cancer No family hx of      Endometrial Cancer No family hx of      Ovarian Cancer No family hx of        Social History     Socioeconomic History     Marital status: Single     Spouse name: Not on file     Number of children: Not on file     Years of education: Not on file     Highest education level: Not on file   Occupational History     Not on file   Tobacco Use     Smoking status: Not on file     Smokeless tobacco: Not on file   Substance and Sexual Activity     Alcohol use: Not on file     Drug use: Not on file     Sexual activity: Not on file   Other Topics Concern     Not on file   Social History Narrative     Not on file     Social Determinants of Health     Financial Resource Strain: Not on file   Food Insecurity: Not on file   Transportation Needs: Not on file   Physical  Activity: Not on file   Stress: Not on file   Social Connections: Not on file   Interpersonal Safety: Not on file   Housing Stability: Not on file       Outpatient Encounter Medications as of 10/2/2023   Medication Sig Dispense Refill     ascorbic acid (VITAMIN C) 500 MG tablet Take 500 mg by mouth daily       B Complex Vitamins (VITAMIN-B COMPLEX) TABS Take 50 mg by mouth daily       Calcium Carb-Ergocalciferol 500-200 MG-UNIT TABS Take 1 tablet by mouth daily       Cholecalciferol (VITAMIN D3) 1000 UNITS CAPS Take 1,000 Units by mouth daily       clindamycin (CLINDAMAX) 1 % topical gel Apply topically 2 times daily 60 g 11     cyanocobalamin (RA VITAMIN B-12 TR) 1000 MCG TBCR Take 1,000 mcg by mouth daily       DHEA 25 MG CAPS Take 2 mg by mouth daily       Fish Oil-Cholecalciferol (FISH OIL + D3) 1125-4067 MG-UNIT CAPS Take 500 mg by mouth daily       GLUCOSAMINE SULFATE PO Take 500 mg by mouth daily       l-tyrosine 500 MG TABS Take 500 mg by mouth daily       melatonin 3 MG tablet Take 3 mg by mouth daily       Menaquinone-7 (VITAMIN K2) 40 MCG TABS Take 100 mcg by mouth daily       Probiotic Product (PROBIOTIC & ACIDOPHILUS EX ST PO) Take 76 mg by mouth daily       progesterone (PROMETRIUM) 200 MG capsule Take 200 mg by mouth daily       Progesterone Micronized (PROGESTERONE, BULK,) POWD powder Take 100 mg by mouth daily       SELENIUM PO Take 100 mcg by mouth daily       No facility-administered encounter medications on file as of 10/2/2023.             O:   NAD, WDWN, Alert & Oriented, Mood & Affect wnl, Vitals stable   Here today alone   General appearance normal   Vitals stable   Alert, oriented and in no acute distress      Following lymph nodes palpated: Occipital, Cervical, Supraclavicular no lad  R temple firm nodule      Stuck on papules and brown macules on trunk and ext   Red papules on trunk  Flesh colored papules on trunk     The remainder of the full exam was normal; the following areas were  examined:  conjunctiva/lids, , neck, peripheral vascular system, abdomen, lymph nodes, digits/nails, eccrine and apocrine glands, scalp/hair, face, neck, chest, abdomen, buttocks, back, RUE, LUE, RLE, LLE       Eyes: Conjunctivae/lids:Normal     ENT: Lips, buccal mucosa, tongue: normal    MSK:Normal    Cardiovascular: peripheral edema none    Pulm: Breathing Normal    Lymph Nodes: No Head and Neck Lymphadenopathy     Neuro/Psych: Orientation:Alert and Orientedx3 ; Mood/Affect:normal       A/P:  1. Seborrheic keratosis, lentigo, angioma, dermal nevus, cyst  Excision discussed with patient   It was a pleasure speaking to Arun Aguilar today.  Previous clinic notes and pertinent laboratory tests were reviewed prior to Arun Aguilar's visit.  Nature and genetics of benign skin lesions dicussed with patient.  Signs and Symptoms of skin cancer discussed with patient.  Patient encouraged to perform monthly skin exams.  UV precautions reviewed with patient.  Risks of non-melanoma skin cancer discussed with patient   Return to clinic 12 months      Again, thank you for allowing me to participate in the care of your patient.        Sincerely,        Duke Bagley MD

## 2023-10-02 NOTE — PATIENT INSTRUCTIONS
-You have a cyst. It can fill with fluid and become bigger and then get smaller. It can continue to to do this. If you wish to have this removed it would be an excision with Dr. Bagley.    You have benign spots called Seborrheic Keratosis and cherry angiomas scattered on your body.

## 2023-10-02 NOTE — PROGRESS NOTES
Arun Aguilar is an extremely pleasant 73 year old year old female patient I was asked to see by Dr. Lopez for spots on skin and face.  Patient has no other skin complaints today.  Remainder of the HPI, Meds, PMH, Allergies, FH, and SH was reviewed in chart.    No past medical history on file.    No past surgical history on file.     Family History   Problem Relation Age of Onset    No Known Problems Mother     No Known Problems Father     No Known Problems Sister     No Known Problems Daughter     No Known Problems Maternal Grandmother     No Known Problems Maternal Grandfather     No Known Problems Paternal Grandmother     No Known Problems Paternal Grandfather     No Known Problems Maternal Aunt     No Known Problems Paternal Aunt     Hereditary Breast and Ovarian Cancer Syndrome No family hx of     Breast Cancer No family hx of     Cancer No family hx of     Colon Cancer No family hx of     Endometrial Cancer No family hx of     Ovarian Cancer No family hx of        Social History     Socioeconomic History    Marital status: Single     Spouse name: Not on file    Number of children: Not on file    Years of education: Not on file    Highest education level: Not on file   Occupational History    Not on file   Tobacco Use    Smoking status: Not on file    Smokeless tobacco: Not on file   Substance and Sexual Activity    Alcohol use: Not on file    Drug use: Not on file    Sexual activity: Not on file   Other Topics Concern    Not on file   Social History Narrative    Not on file     Social Determinants of Health     Financial Resource Strain: Not on file   Food Insecurity: Not on file   Transportation Needs: Not on file   Physical Activity: Not on file   Stress: Not on file   Social Connections: Not on file   Interpersonal Safety: Not on file   Housing Stability: Not on file       Outpatient Encounter Medications as of 10/2/2023   Medication Sig Dispense Refill    ascorbic acid (VITAMIN C) 500 MG tablet Take 500  mg by mouth daily      B Complex Vitamins (VITAMIN-B COMPLEX) TABS Take 50 mg by mouth daily      Calcium Carb-Ergocalciferol 500-200 MG-UNIT TABS Take 1 tablet by mouth daily      Cholecalciferol (VITAMIN D3) 1000 UNITS CAPS Take 1,000 Units by mouth daily      clindamycin (CLINDAMAX) 1 % topical gel Apply topically 2 times daily 60 g 11    cyanocobalamin (RA VITAMIN B-12 TR) 1000 MCG TBCR Take 1,000 mcg by mouth daily      DHEA 25 MG CAPS Take 2 mg by mouth daily      Fish Oil-Cholecalciferol (FISH OIL + D3) 5774-4163 MG-UNIT CAPS Take 500 mg by mouth daily      GLUCOSAMINE SULFATE PO Take 500 mg by mouth daily      l-tyrosine 500 MG TABS Take 500 mg by mouth daily      melatonin 3 MG tablet Take 3 mg by mouth daily      Menaquinone-7 (VITAMIN K2) 40 MCG TABS Take 100 mcg by mouth daily      Probiotic Product (PROBIOTIC & ACIDOPHILUS EX ST PO) Take 76 mg by mouth daily      progesterone (PROMETRIUM) 200 MG capsule Take 200 mg by mouth daily      Progesterone Micronized (PROGESTERONE, BULK,) POWD powder Take 100 mg by mouth daily      SELENIUM PO Take 100 mcg by mouth daily       No facility-administered encounter medications on file as of 10/2/2023.             O:   NAD, WDWN, Alert & Oriented, Mood & Affect wnl, Vitals stable   Here today alone   General appearance normal   Vitals stable   Alert, oriented and in no acute distress      Following lymph nodes palpated: Occipital, Cervical, Supraclavicular no lad  R temple firm nodule      Stuck on papules and brown macules on trunk and ext   Red papules on trunk  Flesh colored papules on trunk     The remainder of the full exam was normal; the following areas were examined:  conjunctiva/lids, , neck, peripheral vascular system, abdomen, lymph nodes, digits/nails, eccrine and apocrine glands, scalp/hair, face, neck, chest, abdomen, buttocks, back, RUE, LUE, RLE, LLE       Eyes: Conjunctivae/lids:Normal     ENT: Lips, buccal mucosa, tongue:  normal    MSK:Normal    Cardiovascular: peripheral edema none    Pulm: Breathing Normal    Lymph Nodes: No Head and Neck Lymphadenopathy     Neuro/Psych: Orientation:Alert and Orientedx3 ; Mood/Affect:normal       A/P:  1. Seborrheic keratosis, lentigo, angioma, dermal nevus, cyst  Excision discussed with patient   It was a pleasure speaking to Arun Aguilar today.  Previous clinic notes and pertinent laboratory tests were reviewed prior to Arun Aguilar's visit.  Nature and genetics of benign skin lesions dicussed with patient.  Signs and Symptoms of skin cancer discussed with patient.  Patient encouraged to perform monthly skin exams.  UV precautions reviewed with patient.  Risks of non-melanoma skin cancer discussed with patient   Return to clinic 12 months

## 2023-10-05 ENCOUNTER — TELEPHONE (OUTPATIENT)
Dept: OBGYN | Facility: CLINIC | Age: 73
End: 2023-10-05
Payer: MEDICARE

## 2023-10-16 ENCOUNTER — TELEPHONE (OUTPATIENT)
Dept: OBGYN | Facility: CLINIC | Age: 73
End: 2023-10-16

## 2023-10-16 ENCOUNTER — OFFICE VISIT (OUTPATIENT)
Dept: OBGYN | Facility: CLINIC | Age: 73
End: 2023-10-16
Payer: MEDICARE

## 2023-10-16 VITALS
WEIGHT: 143 LBS | HEIGHT: 66 IN | BODY MASS INDEX: 22.98 KG/M2 | RESPIRATION RATE: 18 BRPM | SYSTOLIC BLOOD PRESSURE: 150 MMHG | DIASTOLIC BLOOD PRESSURE: 69 MMHG | TEMPERATURE: 98.5 F | HEART RATE: 84 BPM

## 2023-10-16 DIAGNOSIS — Z12.4 CERVICAL CANCER SCREENING: ICD-10-CM

## 2023-10-16 DIAGNOSIS — Z71.89 COUNSELING FOR HORMONE REPLACEMENT THERAPY: ICD-10-CM

## 2023-10-16 DIAGNOSIS — F52.9 SEXUAL AROUSAL DISORDER: ICD-10-CM

## 2023-10-16 DIAGNOSIS — N39.41 URGE INCONTINENCE OF URINE: Primary | ICD-10-CM

## 2023-10-16 PROCEDURE — G0145 SCR C/V CYTO,THINLAYER,RESCR: HCPCS | Performed by: OBSTETRICS & GYNECOLOGY

## 2023-10-16 PROCEDURE — 87624 HPV HI-RISK TYP POOLED RSLT: CPT | Mod: GY | Performed by: OBSTETRICS & GYNECOLOGY

## 2023-10-16 PROCEDURE — 99205 OFFICE O/P NEW HI 60 MIN: CPT | Performed by: OBSTETRICS & GYNECOLOGY

## 2023-10-16 NOTE — NURSING NOTE
"Initial BP (!) 150/69 (BP Location: Right arm, Patient Position: Chair, Cuff Size: Adult Regular)   Pulse 84   Temp 98.5  F (36.9  C) (Tympanic)   Resp 18   Ht 1.676 m (5' 6\")   Wt 64.9 kg (143 lb)   BMI 23.08 kg/m   Estimated body mass index is 23.08 kg/m  as calculated from the following:    Height as of this encounter: 1.676 m (5' 6\").    Weight as of this encounter: 64.9 kg (143 lb). .    "

## 2023-10-16 NOTE — PROGRESS NOTES
[facility]    Gynecology Office Note    CC:   Chief Complaint   Patient presents with    Consult        HPI: Arun Aguilar is a 73 year old who presents for a few concerns today.    -Requesting pap smear today. Denies any history of high grade cervical dysplasia, no history of LEEP. In her 20s had some cells frozen and then cauterized on her cervix. No history of abnormal paps in last 25 years. Had adequate screening to age 65 per records.   -Lack of sensation of her clitoris. She was started on bio identical hormone replacement therapy by her primary care provider for this complaint 5 years ago. Reports no improvement. Has also tried multiple creams (testosterone, sildenafil, other compounded) without improvement. Believes she was just started on the hormones for the difficulty achieving orgasm, but now has a lot of hot flashes if she weans off of the medications.  -Reports some urge incontinence. Small amount of leakage on the way to the bathroom. Up 1-2 times overnight to urinate. Some bathroom maping. No leakage with coughing or sneezing.     GYN Hx:     Patient is menopausal: yes  Can not remember age at menopause. Denies postmenopausal bleeding.    On HRT for 5 years, reviewed medications: (prescribed by outside provider)  PO estradiol 4mg daily  Progesterone 900mg at bedtime  Testosterone cream applied to clitoris     No LMP recorded. Patient is postmenopausal.      Last Pap Smear:   Lab Results   Component Value Date    PAP NIL 08/15/2017       ROS: A 10 pt ROS was completed and found to be otherwise negative unless mentioned in the HPI.     PMH:   No past medical history on file.    PSHx:   No past surgical history on file.    OBHx:   OB History   No obstetric history on file.       Medications:   ascorbic acid (VITAMIN C) 500 MG tablet, Take 500 mg by mouth daily  B Complex Vitamins (VITAMIN-B COMPLEX) TABS, Take 50 mg by mouth daily  Calcium Carb-Ergocalciferol 500-200  MG-UNIT TABS, Take 1 tablet by mouth daily  Cholecalciferol (VITAMIN D3) 1000 UNITS CAPS, Take 1,000 Units by mouth daily  clindamycin (CLINDAMAX) 1 % topical gel, Apply topically 2 times daily  cyanocobalamin (RA VITAMIN B-12 TR) 1000 MCG TBCR, Take 1,000 mcg by mouth daily  DHEA 25 MG CAPS, Take 2 mg by mouth daily  Fish Oil-Cholecalciferol (FISH OIL + D3) 0177-6655 MG-UNIT CAPS, Take 500 mg by mouth daily  GLUCOSAMINE SULFATE PO, Take 500 mg by mouth daily  l-tyrosine 500 MG TABS, Take 500 mg by mouth daily  melatonin 3 MG tablet, Take 3 mg by mouth daily  Menaquinone-7 (VITAMIN K2) 40 MCG TABS, Take 100 mcg by mouth daily  Probiotic Product (PROBIOTIC & ACIDOPHILUS EX ST PO), Take 76 mg by mouth daily  progesterone (PROMETRIUM) 200 MG capsule, Take 200 mg by mouth daily  Progesterone Micronized (PROGESTERONE, BULK,) POWD powder, Take 100 mg by mouth daily  SELENIUM PO, Take 100 mcg by mouth daily    No current facility-administered medications on file prior to visit.      Allergies:      Allergies   Allergen Reactions    Cat Hair Extract     Oxycodone Nausea and Vomiting    Wheat Bran Nausea    Chlorhexidine Rash     Patient got rash over entire abdomen after umbilical hernia repair 4/14/17.  Chloraprep was used.    Corticosteroids Rash and Swelling     Break out       Social History:   Social History     Socioeconomic History    Marital status: Single     Spouse name: Not on file    Number of children: Not on file    Years of education: Not on file    Highest education level: Not on file   Occupational History    Not on file   Tobacco Use    Smoking status: Not on file    Smokeless tobacco: Not on file   Substance and Sexual Activity    Alcohol use: Not on file    Drug use: Not on file    Sexual activity: Not on file   Other Topics Concern    Not on file   Social History Narrative    Not on file     Social Determinants of Health     Financial Resource Strain: Not on file   Food Insecurity: Not on file  "  Transportation Needs: Not on file   Physical Activity: Not on file   Stress: Not on file   Social Connections: Not on file   Interpersonal Safety: Not on file   Housing Stability: Not on file         Family History:   Family History   Problem Relation Age of Onset    No Known Problems Mother     No Known Problems Father     No Known Problems Sister     No Known Problems Daughter     No Known Problems Maternal Grandmother     No Known Problems Maternal Grandfather     No Known Problems Paternal Grandmother     No Known Problems Paternal Grandfather     No Known Problems Maternal Aunt     No Known Problems Paternal Aunt     Hereditary Breast and Ovarian Cancer Syndrome No family hx of     Breast Cancer No family hx of     Cancer No family hx of     Colon Cancer No family hx of     Endometrial Cancer No family hx of     Ovarian Cancer No family hx of        Physical Exam:   Vitals:    10/16/23 1300   BP: (!) 150/69   BP Location: Right arm   Patient Position: Chair   Cuff Size: Adult Regular   Pulse: 84   Resp: 18   Temp: 98.5  F (36.9  C)   TempSrc: Tympanic   Weight: 64.9 kg (143 lb)   Height: 1.676 m (5' 6\")      Estimated body mass index is 23.08 kg/m  as calculated from the following:    Height as of this encounter: 1.676 m (5' 6\").    Weight as of this encounter: 64.9 kg (143 lb).    General appearance: well-hydrated, A&O x 3, no apparent distress  Lungs: Equal expansion bilaterally, no accessory muscle use  Heart: No heaves or thrills.   Constitutional: See vitals  Extremities: no edema  Neuro: CN II-XII grossly intact  Genitourinary:  External genitalia: no erythema, no lesions. Normal architecture and pigmentation  Urethral meatus appropriate location without lesions or prolapse  Urethra: No masses, tenderness, or scarring  Bladder no fullness, masses, or tenderness.  Anus and Perineum: Unremarkable, no visible lesions  Vagina: Normal, healthy pink mucosa without any lesions. Physiologic vaginal discharge. "   Cervix: normal appearance, no cervical motion tenderness.       Assessment and Plan:     Encounter Diagnoses   Name Primary?    Urge incontinence of urine Yes    Cervical cancer screening     Counseling for hormone replacement therapy     Sexual arousal disorder      -Request for pap smear: discussed that in general, pap smear screening discontinued after age 65 if no severe dysplasia in the past 25 years and adequate prior screening. Reviewed patient's past pap smear history, no known factors to continue screening. She is still concerned about her risk of developing cervical cancer. I am uncertain on insurance coverage for continued screening at this point. Patient encouraged to inquire with insurance coverage if cost is a concern (could be cash pay if not covered) prior to collection. Per patient request, pap smear was collected today.    -Urge incontinence: Discussed symptoms and management of urge incontinence.  Given information today on bladder irritants, bladder training, and Kegel exercises.  Pelvic floor physical therapy referral placed.  Patient is not interested in medications at this time.    -Sexual arousal disorder: Patient reports lack of feeling to her clitoris. She is eventually able to achieve somewhat of an orgasm, but not like she did in the past. She is on hormone replacement therapy and additionally has tried multiple different creams applied to the area.  She has had minimal to no response to any of these therapies.  Exam is normal today, no structural causes identified.  Discussed sexual health resources and educational material on normal menopausal changes. Would consider referral to sexual health clinic for further evaluation.    -Hormone replacement therapy: patient is receiving HRT from outside provider. She reports this was started 5 years ago for difficulty achieving an orgasm. Discussed my concerns with current hormone regimen including higher than typical dosing, age of onset of HRT  (likely >10 years from menopause onset), and continued therapy if she is not getting any improvement in symptoms. She doesn't remember if she was having bad hot flashes prior to starting but how has hot flashes if she runs out of medication. Discussed risks of HRT in setting of high doses, starting HRT after 10 years of menopause. Specifically discussed risks of heart attack, stroke, VTE, possible increased risk of breast or uterine cancers. Also discussed acceptance of these risk in setting of lack of improvement of symptoms. Encouraged to readdress usage and possible trial of alternative therapies.      Return to clinic as needed.    Treasure Brink DO      65 minutes spent by me on the date of the encounter doing chart review, review of test results, patient visit, and documentation.

## 2023-10-16 NOTE — TELEPHONE ENCOUNTER
Lm to call clinic regarding appointment today with Dr Brink. Patient no longer needs paps. If she does not have any GYN concerns please cancel appt. And schedule physical with family practice. Daria Siddiqui LPN

## 2023-10-18 LAB
BKR LAB AP GYN ADEQUACY: NORMAL
BKR LAB AP GYN INTERPRETATION: NORMAL
BKR LAB AP HPV REFLEX: NORMAL
BKR LAB AP PREVIOUS ABNORMAL: NORMAL
PATH REPORT.COMMENTS IMP SPEC: NORMAL
PATH REPORT.COMMENTS IMP SPEC: NORMAL
PATH REPORT.RELEVANT HX SPEC: NORMAL

## 2023-10-19 LAB
HUMAN PAPILLOMA VIRUS 16 DNA: NEGATIVE
HUMAN PAPILLOMA VIRUS 18 DNA: NEGATIVE
HUMAN PAPILLOMA VIRUS FINAL DIAGNOSIS: NORMAL
HUMAN PAPILLOMA VIRUS OTHER HR: NEGATIVE

## 2023-11-17 ENCOUNTER — THERAPY VISIT (OUTPATIENT)
Dept: PHYSICAL THERAPY | Facility: CLINIC | Age: 73
End: 2023-11-17
Attending: OBSTETRICS & GYNECOLOGY
Payer: MEDICARE

## 2023-11-17 DIAGNOSIS — N39.41 URGE INCONTINENCE OF URINE: ICD-10-CM

## 2023-11-17 PROCEDURE — 97162 PT EVAL MOD COMPLEX 30 MIN: CPT | Mod: GP | Performed by: PHYSICAL THERAPIST

## 2023-11-17 PROCEDURE — 97110 THERAPEUTIC EXERCISES: CPT | Mod: GP | Performed by: PHYSICAL THERAPIST

## 2023-11-17 PROCEDURE — 97535 SELF CARE MNGMENT TRAINING: CPT | Mod: GP | Performed by: PHYSICAL THERAPIST

## 2023-11-17 NOTE — PROGRESS NOTES
"PHYSICAL THERAPY EVALUATION  Type of Visit: Evaluation    See electronic medical record for Abuse and Falls Screening details.    Subjective     Pt read about women over 65 having higher risk of cervical ca, so went to get pap smear from OB/GYN and was referrred to PT for urge incontinence. Primary c/o today is urge incontinence and asensory clitoral stimulation. Has never been  or sexually active, but does self-stimulation and notes most recently not able to get orgasms. Had an \"O shot\" which is PRP to the clitoris and \"inside\" and this stopped the leaking for almost 6 mos. But, now the leaking is gradually returning.   Presenting condition or subjective complaint: Urge incontinence, frequently having to urinate, and not able to get orgasm with clitoral stimulation.  Date of onset: 10/16/23    Relevant medical history: Bladder or bowel problems; Menopause   Dates & types of surgery: Hernia in 2018    Prior diagnostic imaging/testing results:       Prior therapy history for the same diagnosis, illness or injury: No      Prior Level of Function  Transfers: Independent  Ambulation: Independent  ADL: Independent  IADL: Driving, Finances, Housekeeping, Laundry, Meal preparation    Living Environment  Social support: Alone   Type of home: House   Stairs to enter the home:         Ramp:     Stairs inside the home:         Help at home: None  Equipment owned:       Employment: No 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  Hobbies/Interests: Pottery    Patient goals for therapy: Not leak, not go to the bathroom as often and be able to orgasm with clitoral stimulation.    Pain assessment: Pain denied     Objective      PELVIC EVALUATION  ADDITIONAL HISTORY:  Sex assigned at birth: Female  Gender identity: Female    Pronouns: She/Her Hers      Bladder History:  Feels bladder filling:    Triggers for feeling of inability to wait to go to the bathroom:      How long can you wait to urinate: not " long  Gets up at night to urinate: Yes 2  Can stop the flow of urine when urinating: Sometimes  Volume of urine usually released: Medium   Other issues:    Number of bladder infections in last 12 months:    Fluid intake per day: Plain Water = minimal with pills Tea (black, green and marian) = 50oz/day    Medications taken for bladder: No     Activities causing urine leak: Hurrying to the bathroom due to a strong urge to urinate (pee)    Amount of urine typically leaked: small amount  Pads used to help with leaking: No        Bowel History:  Frequency of bowel movement: 1-2x per day  Consistency of stool: Soft-formed    Ignores the urge to defecate: No  Other bowel issues:    Length of time spent trying to have a bowel movement:      Sexual Function History:  Sexual orientation: Straight    Sexually active: No  Lubrication used: No No  Pelvic pain:      Pain or difficulty with orgasms/erection/ejaculation: Yes Cannot climax  State of menopause: Post-menopause (I am done with menopause)  Hormone medications: Yes Bi-Est, Progesterone, Testosterone    Are you currently pregnant: No, Number of previous pregnancies: 0, Number of deliveries: 0, Have you been diagnosed with pelvic prolapse or abdominal separation: No, Do you get regular exercise: Yes, I do this type of exercise: climb stairs, Have you tried pelvic floor strengthening exercises for 4 weeks: No, Do you have any history of trauma that is relevant to your care that you d like to share: No    Discussed reason for referral regarding pelvic health needs and external/internal pelvic floor muscle examination with patient/guardian.  Opportunity provided to ask questions and verbal consent for assessment and intervention was given.    PAIN: denies  POSTURE: WNL  LUMBAR SCREEN: AROM WFL  HIP SCREEN:  Strength: WFL   Functional Strength Testing:  WFL    PELVIC/SI SCREEN:  WFL   BREATHING SYMMETRY: Asymmetrical, Decreased rib cage mobility    PELVIC EXAM  Deferred today  based on time constraints of session.    ABDOMINAL ASSESSMENT  Diastasis Rectus Abdominis (APPLE):  Not noted today    Abdominal Activation/Strength:  WFL    Scar:   Location/Type: NA  Mobility:    NA    Fascial Tension/Restriction/Tone: WNL    BIOFEEDBACK: Medicare considers experimental  *Most likely will train with [facility]  DERMATOMES: WNL    Assessment & Plan   CLINICAL IMPRESSIONS  Medical Diagnosis: Urge incontinence of urine    Treatment Diagnosis: Pelvic Floor Muscle Dysfunction   Impression/Assessment: Patient is a 73 year old female with urge UI, and urinary frequency; additionally unable to climax with clitoral stimulation complaints.  The following significant findings have been identified: Decreased strength, Impaired sensation, and Impaired muscle performance. These impairments interfere with their ability to perform self care tasks and recreational activities as compared to previous level of function.     Clinical Decision Making (Complexity):  Clinical Presentation: Unstable/Unpredictable   Clinical Presentation Rationale: based on medical and personal factors listed in PT evaluation  Clinical Decision Making (Complexity): Moderate complexity    PLAN OF CARE  Treatment Interventions:  Modalities: Biofeedback, E-stim, Ultrasound  Interventions: Manual Therapy, Neuromuscular Re-education, Therapeutic Activity, Therapeutic Exercise, Self-Care/Home Management    Long Term Goals     PT Goal 1  Goal Identifier: STG  Goal Description: 1)Pt will report void times consistently of 60 mins or longer, in 4 weeks.  Target Date: 12/15/23  PT Goal 2  Goal Identifier: STG  Goal Description: 2)Pt will report no leaking with 50% of urges in 4 weeks.  Target Date: 01/12/24  PT Goal 3  Goal Identifier: LTG  Goal Description: 3)Pt will report void times of every 1.5 hours in 8 weeks.  Target Date: 01/12/24  PT Goal 4  Goal Identifier: LTG  Goal Description: 4)Pt will report no leaking with urges in 8 weeks.  Target Date:  01/12/24  PT Goal 5  Goal Identifier: LTG  Goal Description: 5)Pt will report ability to reach climax with clitoral stimulation in 8 weeks.  Target Date: 01/12/24      Frequency of Treatment: 1x per week  Duration of Treatment: 8 weeks    Recommended Referrals to Other Professionals:  none  Education Assessment:   Learner/Method: Patient;Listening;Reading;Demonstration;Pictures/Video;No Barriers to Learning    Risks and benefits of evaluation/treatment have been explained.   Patient/Family/caregiver agrees with Plan of Care.     Evaluation Time:     PT Eval, Moderate Complexity Minutes (26713): 25   Present: Not applicable     Signing Clinician: Di Deng, PT      [facility]                                                                                   OUTPATIENT PHYSICAL THERAPY      PLAN OF TREATMENT FOR OUTPATIENT REHABILITATION   Patient's Last Name, First Name, Arun Martin YOB: 1950   Provider's Name   [facility]   Medical Record No.  [id number]     Onset Date: 10/16/23  Start of Care Date: 11/17/23     Medical Diagnosis:  Urge incontinence of urine      PT Treatment Diagnosis:  Pelvic Floor Muscle Dysfunction Plan of Treatment  Frequency/Duration: 1x per week/ 8 weeks    Certification date from 11/17/23 to 01/12/24         See note for plan of treatment details and functional goals     Di Deng, PT                         I CERTIFY THE NEED FOR THESE SERVICES FURNISHED UNDER        THIS PLAN OF TREATMENT AND WHILE UNDER MY CARE     (Physician attestation of this document indicates review and certification of the therapy plan).              Referring Provider:  Treasure Brink    Initial Assessment  See Epic Evaluation- Start of Care Date: 11/17/23

## 2024-01-10 ENCOUNTER — THERAPY VISIT (OUTPATIENT)
Dept: PHYSICAL THERAPY | Facility: CLINIC | Age: 74
End: 2024-01-10
Attending: OBSTETRICS & GYNECOLOGY
Payer: MEDICARE

## 2024-01-10 DIAGNOSIS — N39.41 URGE INCONTINENCE OF URINE: Primary | ICD-10-CM

## 2024-01-10 PROCEDURE — 90913 BFB TRAINING EA ADDL 15 MIN: CPT | Mod: GP | Performed by: PHYSICAL THERAPIST

## 2024-01-10 PROCEDURE — 97110 THERAPEUTIC EXERCISES: CPT | Mod: GP,59 | Performed by: PHYSICAL THERAPIST

## 2024-01-10 PROCEDURE — 90912 BFB TRAINING 1ST 15 MIN: CPT | Mod: GP | Performed by: PHYSICAL THERAPIST

## 2024-01-24 ENCOUNTER — THERAPY VISIT (OUTPATIENT)
Dept: PHYSICAL THERAPY | Facility: CLINIC | Age: 74
End: 2024-01-24
Attending: OBSTETRICS & GYNECOLOGY
Payer: MEDICARE

## 2024-01-24 DIAGNOSIS — N39.41 URGE INCONTINENCE OF URINE: Primary | ICD-10-CM

## 2024-01-24 PROCEDURE — 97110 THERAPEUTIC EXERCISES: CPT | Mod: GP | Performed by: PHYSICAL THERAPIST

## 2024-01-24 PROCEDURE — 97535 SELF CARE MNGMENT TRAINING: CPT | Mod: GP | Performed by: PHYSICAL THERAPIST

## 2024-02-06 ENCOUNTER — PATIENT OUTREACH (OUTPATIENT)
Dept: OBGYN | Facility: CLINIC | Age: 74
End: 2024-02-06
Payer: MEDICARE

## 2024-02-06 NOTE — TELEPHONE ENCOUNTER
Panel Management Review      Health Maintenance List    Health Maintenance   Topic Date Due    ADVANCE CARE PLANNING  Never done    COVID-19 Vaccine (1) Never done    HEPATITIS C SCREENING  Never done    RSV VACCINE (Pregnancy & 60+) (1 - 1-dose 60+ series) Never done    FALL RISK ASSESSMENT  Never done    ZOSTER IMMUNIZATION (2 of 3) 07/24/2015    Pneumococcal Vaccine: 65+ Years (2 of 2 - PCV) 12/29/2017    MEDICARE ANNUAL WELLNESS VISIT  08/15/2018    MAMMO SCREENING  09/15/2019    GLUCOSE  06/22/2020    LIPID  10/31/2021    INFLUENZA VACCINE (1) 09/01/2023    PHQ-2 (once per calendar year)  01/01/2024    DTAP/TDAP/TD IMMUNIZATION (2 - Td or Tdap) 06/04/2024    COLORECTAL CANCER SCREENING  06/11/2025    DEXA  08/11/2038    IPV IMMUNIZATION  Aged Out    HPV IMMUNIZATION  Aged Out    MENINGITIS IMMUNIZATION  Aged Out    RSV MONOCLONAL ANTIBODY  Aged Out       Composite cancer screening  Chart review shows that this patient is due/due soon for the following Mammogram  Lab Results   Component Value Date    PAP NIL 08/15/2017     No past surgical history on file.    Is hysterectomy listed in surgical history? No   Is mastectomy listed in surgical history? No     Summary:    Patient is due/failing the following:   Mammogram    Action needed: Patient needs office visit for mammogram.    Type of outreach:  Sent [employer] message.      Staff Signature:  Mojgan Lam CMA

## 2024-02-06 NOTE — LETTER
February 6, 2024      Arun Aguilar  [address] [place] [place] [address]              Dear Arun,      To ensure we are providing the best quality care, we have reviewed your chart and see that you are due for:    Breast Cancer Screening:    Please call [facility]  at [phone] to schedule a Mammogram.    You may call Dept: [phone] if you have any questions. If you have completed the mammogram outside of [facility], please have the results forwarded to our office Fax # [phone]. We will update the chart for your primary Physician to review before your next annual physical.        Sincerely,      Treasure Brink DO

## 2024-02-07 ENCOUNTER — THERAPY VISIT (OUTPATIENT)
Dept: PHYSICAL THERAPY | Facility: CLINIC | Age: 74
End: 2024-02-07
Attending: OBSTETRICS & GYNECOLOGY
Payer: MEDICARE

## 2024-02-07 DIAGNOSIS — N39.41 URGE INCONTINENCE OF URINE: Primary | ICD-10-CM

## 2024-02-07 PROCEDURE — 97110 THERAPEUTIC EXERCISES: CPT | Mod: GP | Performed by: PHYSICAL THERAPIST

## 2024-02-07 PROCEDURE — 97535 SELF CARE MNGMENT TRAINING: CPT | Mod: GP | Performed by: PHYSICAL THERAPIST

## 2024-04-09 ENCOUNTER — THERAPY VISIT (OUTPATIENT)
Dept: PHYSICAL THERAPY | Facility: CLINIC | Age: 74
End: 2024-04-09
Attending: OBSTETRICS & GYNECOLOGY
Payer: MEDICARE

## 2024-04-09 DIAGNOSIS — N39.41 URGE INCONTINENCE OF URINE: Primary | ICD-10-CM

## 2024-04-09 PROCEDURE — 97535 SELF CARE MNGMENT TRAINING: CPT | Mod: GP | Performed by: PHYSICAL THERAPIST

## 2024-04-09 PROCEDURE — 97110 THERAPEUTIC EXERCISES: CPT | Mod: GP | Performed by: PHYSICAL THERAPIST

## 2024-04-10 NOTE — PROGRESS NOTES
Physical Therapy Progress Note      [facility]                                                                                   OUTPATIENT PHYSICAL THERAPY    PLAN OF TREATMENT FOR OUTPATIENT REHABILITATION   Patient's Last Name, First Name, Arun Martin YOB: 1950   Provider's Name   [facility]ULO [facility]   Medical Record No.  [id number]     Onset Date: 10/16/23  Start of Care Date: 11/17/23     Medical Diagnosis:  Urge incontinence of urine      PT Treatment Diagnosis:  Pelvic Floor Muscle Dysfunction Plan of Treatment  Frequency/Duration: 1x per week/ 8 weeks    Certification date from 03/01/24  to 5/21/24       See note for plan of treatment details and functional goals     Di Deng, PT                         I CERTIFY THE NEED FOR THESE SERVICES FURNISHED UNDER        THIS PLAN OF TREATMENT AND WHILE UNDER MY CARE     (Physician attestation of this document indicates review and certification of the therapy plan).              Referring Provider:  Treasure Brink    Initial Assessment  See Epic Evaluation- Start of Care Date: 11/17/23            PLAN  Continue therapy per current plan of care.    Beginning/End Dates of Progress Note Reporting Period:  04/09/24 to 04/09/2024    Referring Provider:  Treasure Brink     04/09/24 0500   Appointment Info   Signing clinician's name / credentials Di Deng, PT MA #[id number]   Total/Authorized Visits 8 (Medicare)   Visits Used 5   Medical Diagnosis Urge incontinence of urine   PT Tx Diagnosis Pelvic Floor Muscle Dysfunction   Quick Adds Pelvic Consent;Certification   Progress Note/Certification   Start of Care Date 11/17/23   Onset of illness/injury or Date of Surgery 10/16/23   Therapy Frequency 1x per week   Predicted Duration 8 weeks   Certification date from 01/12/24   Certification date to 03/01/24   Progress Note Due Date 03/01/24   Progress Note Completed Date 04/09/24   PT  "Goal 1   Goal Identifier STG   Goal Description 1)Pt will report void times consistently of 60 mins or longer, in 4 weeks.   Goal Progress Not Met: will void anywhere from 30 mins to 2.5 hours if in a class; states not having a consistent routine.  (cont for 4 weeks)   Target Date 05/07/24   PT Goal 2   Goal Identifier STG   Goal Description 2)Pt will report no leaking with 50% of urges in 4 weeks.   Goal Progress Met: pt states it has been at least a couple of weeks since her last leak.   Target Date 01/12/24   Date Met 01/10/24   PT Goal 3   Goal Identifier LTG   Goal Description 3)Pt will report void times of every 1.5 hours in 8 weeks.   Goal Progress Not Met: will void anywhere from 30 mins to 2.5 hours if in a class; states not having a consistent routine.  (cont x 6 weeks)   Target Date 05/21/24   PT Goal 4   Goal Identifier LTG   Goal Description 4)Pt will report no leaking with urges in 8 weeks.   Goal Progress Met: pt states \"I am not concerned about or bothered with any leaking.\"  States \"I am here to really try and get back my ability to climax.\"  (cont x 6 weeks)   Target Date 02/21/24   Date Met 04/09/24   PT Goal 5   Goal Identifier LTG   Goal Description 5)Pt will report ability to reach climax with clitoral stimulation in 8 weeks.   Goal Progress Not Met: pt states she is not able to have any orgasms with self stimulation/vibrator use.  (cont fo 8 weeks.)   Target Date 06/04/24   Subjective Report   Subjective Report Not leaking but still has c/o frequency, but re-iterates to PT that her primary reason for coming to PT is to restore her ability to climax with self-pleasure.  Is going every 30 mins sometimes, and up to 60 mins.  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 Had an injection to clitoris in early March 2024 and did not find it as helpful as when she had PRP inj in 2022.  This time she had prolozone injected as the first inj was painful.  Still no return to ability to " "climax.  States \"I am not leaking, and am really not bothered by the urgency and frequency.  I am here to try and restore my ability to climax with self-pleasure.\"   Objective Measure 1   Objective Measure RUSI   Details TAUS: Prevoid 75ml, PVR = undetectable; Able to see \"quick flick\" in bladder base with cue to contract PFM, No ability to hold the PFM for < 2 sec.  TPUS: midsagittal approach, very middle to posterior compartment heavy with mvmt and is nearly a \"violent\" contraction for 2 seconds and quickly falls to baseline.  No ability to sustain contraction.   Objective Measure 2   Objective Measure Void Times   Details Urges are less strong, but still coming about every 30 mins to 60 mins usually   Therapeutic Procedure/Exercise   Therapeutic Procedures: strength, endurance, ROM, flexibility minutes (87453) 30   Ther Proc 1 - Details Taught elevators for working on longer hold ability in Pelvic Floor Muscles.  Re-explained to pt that this will help her urgency and frequency and in turn ALSO help restore the pelvic tissue to better health which may help improve her ability to climax with self-pleasure.  Pt instructed in varying effort levels of PFM contraction using RUSI to show her how her body is only using fast twitch mm fibers. She has an all or none capacity in the PFM and no ability to hold.  Reviewed and completed diaphragm breathing to further work on longer holds with a \"gentle\" PFC on the exhale.   Skilled Intervention Exer: strengthening, slow twitch/longer holds; progession of HEP   Patient Response/Progress Pt struggles with the \"all or nothing\" action happening in her PFM.  Elevators were helpful to get a little longer hold, but she still could not sustain a contraction without grading the effort into an elevator.   Self Care/home Management   ADL/Home Mgmt Training (05340) 30   Self Care 1 - Details Anatomy of clitoris for stimulation guidance and for identifying locations to try her testosterone " "cream. Spent time showing pt anatomy pictures. Discussed where she could be stimulating along the \"roots\" of the clitoris vs directly at the clitoris.  Discussed different methods of stim and using pressure against the pubic bone as an option, and varying pressures, mvmts and then finding out what stims best.  Tried looking up varying vibrators as told pt brand and options for frequency of vibration and power of vibration could play a role in her feeling the stim as well.  Told pt to vary positions that her legs/hips are in during attempts to stim as well.  Explained at length that stretngthening the PFM will help with restoring tissue health which may in turn help sensation/nerve issues.   Skilled Intervention Self-care: use of dilators, self-stim tips   Patient Response/Progress Pt very involved in \"getting this stimulation thing back.\"  States she will try doing some \"exploring\" for where she may feel more stim.   Plan   Home program PTRx - print sheets as she has no internet at home.   Updates to plan of care Added elevators and reminded of diaphragm breathing.   Plan for next session See pt in 2 weeks. Cont to work on strengthening for longer holds. Train with RUSI and/or BF.  Advance HEP.   Comments   Pelvic Health Informed Consent Statement Discussed with patient/guardian reason for referral regarding pelvic health needs and external/internal pelvic floor muscle examination.  Opportunity provided to ask questions and verbal consent for assessment and intervention was given.   Total Session Time   Timed Code Treatment Minutes 60   Total Treatment Time (sum of timed and untimed services) 60   Thank you for the referral of this patient.  Di Deng, PT, MA  #[id number]      "

## 2024-04-10 NOTE — PROGRESS NOTES
Physical Therapy Progress Note      [facility]                                                                                   OUTPATIENT PHYSICAL THERAPY    PLAN OF TREATMENT FOR OUTPATIENT REHABILITATION   Patient's Last Name, First Name, Arun Martin YOB: 1950   Provider's Name   [facility]ULO [facility]   Medical Record No.  [id number]     Onset Date:   10/16/23 Start of Care Date:  11/17/23     Medical Diagnosis:   Urge incontinence of urine      PT Treatment Diagnosis:   Pelvic Floor Muscle Dysfunction Plan of Treatment  Frequency/Duration: 1x per every other week; 12 weeks    Certification date from  1/12/24 to 3/1/24       See note for plan of treatment details and functional goals     Di Deng, PT                         I CERTIFY THE NEED FOR THESE SERVICES FURNISHED UNDER        THIS PLAN OF TREATMENT AND WHILE UNDER MY CARE     (Physician attestation of this document indicates review and certification of the therapy plan).              Referring Provider:  Treasure Brink    Initial Assessment  See Epic Evaluation-              PLAN  Continue therapy per current plan of care.    Beginning/End Dates of Progress Note Reporting Period:    to 01/10/2024    Referring Provider:  Treasure Brink    01/10/24 0500   Appointment Info   Signing clinician's name / credentials Di Deng, PT MA #[id number]   Total/Authorized Visits 8 (Medicare)   Visits Used 2   Medical Diagnosis Urge incontinence of urine   PT Tx Diagnosis Pelvic Floor Muscle Dysfunction   Quick Adds Pelvic Consent;Certification   Progress Note/Certification   Start of Care Date 11/17/23   Onset of illness/injury or Date of Surgery 10/16/23   Therapy Frequency 1x per week   Predicted Duration 8 weeks   Certification date from 01/12/24   Certification date to 03/01/24   Progress Note Due Date 01/12/24   Progress Note Completed Date 01/10/24   PT Goal 1   Goal  "Identifier STG   Goal Description 1)Pt will report void times consistently of 60 mins or longer, in 4 weeks.   Goal Progress Not Met: will void anywhere from 30 mins to 2 hours; states not having a consistent routine.  (cont for 3 weeks)   Target Date 01/31/24   PT Goal 2   Goal Identifier STG   Goal Description 2)Pt will report no leaking with 50% of urges in 4 weeks.   Target Date 01/12/24   Goal Progress Met: pt states it has been at least a couple of weeks since her last leak.   Date Met 01/10/24   PT Goal 3   Goal Identifier LTG   Goal Description 3)Pt will report void times of every 1.5 hours in 8 weeks.   Target Date 02/07/24   Goal Progress Not Met: pt is still voiding every 30 mins at times.  (cont x 4 weeks)   PT Goal 4   Goal Identifier LTG   Goal Description 4)Pt will report no leaking with urges in 8 weeks.   Target Date 02/21/24   Goal Progress Not Met: pt still leaking with strong and sudden urges.  (cont x 6 weeks)   PT Goal 5   Goal Identifier LTG   Goal Description 5)Pt will report ability to reach climax with clitoral stimulation in 8 weeks.   Target Date 03/06/24   Goal Progress Not Met: pt states she is not able to have any orgasms with self stimulation/vibrator use.  (cont fo 8 weeks.)   Subjective Report   Subjective Report \"Doing okay.\"  Drinking black tea in the am and then following with a cup of warm water. Still getting the urge to pee all the time, and leaking occasionally.  Most bothersome is the fact that she can no longer have/stimulate herself to an orgasm.   Objective Measure 1   Objective Measure Leaking   Details Cannot remember the last time had leaking.  \"Maybe a couple of weeks ago\" with a sudden urge to void.   Objective Measure 2   Objective Measure Void Times   Details Urinates: 30 mins to every 2 hours, and overnite last nite was up 3x to void.  BMs: daily at least 1x   Objective Measure 3   Objective Measure Palpation   Details Pt initially \"jumpy\" with external palpation " "at Perineum.  PERF: power = 4/5, Endurance = < 3 sec, Repetitions = 10 reps for < 3 sec; Fast Twitch = 5 in 10 sec; Pain: \"sore\" with (L) vs (R) at pubovaginalis mm.   Objective Measure 4   Objective Measure Biofeedback   Details Abdominals: good level control; PFM: Max = 24uV, Avg (level able to hold 3 sec) = 9uV, rest = 5uV, and endurance = < 3 sec   Biofeedback   Treatment Detail Obtained consent for rx from pt after explaining procedure. Pt in supported supine position with abdominal and adhesive perianal electrodes placed. BF administered using the [employer] device and [employer] software. Pt guided through quick and hold contractions using BF to enhance quality and control of muscle contractions.  Trained pt to vary her efforts in PFM contractions to attempt longer holds. Used visual aspect of BF to train in roll-ins and roll-outs sequences to allow longer/endurance holds in PFCs. Worked on relaxation between each exer/contraction to allow largest gradient between rest and contract to easily replicate contractions at home.   Biofeedback dual channel   Patient Response/Progress Pt able to complete longer holds of PFM, but does still require longer rest periods between each contraction to reach max relaxation.   Pelvic Floor Muscles (PFM) Biofeedback 1st 15 Min ONLY (47249) 15   Pelvic Floor Muscles (PFM) Biofeedback Each Addl 15 Min (32615) 15   Muscle RA and PFM   Therapeutic Procedure/Exercise   Therapeutic Procedures: strength, endurance, ROM, flexibility minutes (62948) 23   Ther Proc 1 - Details Reviewed and reminded pt of PFC quick and holds. Taught to do in any position and try at various times throughout the day. After learning how to complete roll-ins/outs, taught how to set up at home and told to do only 5 reps alternating between each 1-3x per day.  Pt then had questions re: \"How is this going to help me achieve the orgasms again?\"  Fully explained that building up the strength in the PFM and " using this muscle functionally and during the day with exer will increase circulation and build up the tissue of the perineum to better feel and achieve arousal. Told pt she will need to work on this for 3-4 mos often, before the muscle will make the physical changes with the routine of exer.   Skilled Intervention Exer: awareness, functional use of PFM, strengthening of PFM; progression of HEP   Patient Response/Progress Pt able to do exers correctly. Asking appropriate questions re: expectations with exers and how long/often will need to be doing exers.   Plan   Home program Printed sheet with roll-ins and roll-outs and reminder of quicks and holds.   Plan for next session See pt in 2-3 weeks. Cont to train PFM with RUSI prn.  Advance HEP.   Comments   Pelvic Health Informed Consent Statement Discussed with patient/guardian reason for referral regarding pelvic health needs and external/internal pelvic floor muscle examination.  Opportunity provided to ask questions and verbal consent for assessment and intervention was given.   Total Session Time   Timed Code Treatment Minutes 53   Total Treatment Time (sum of timed and untimed services) 53   Thank you for the referral of this patient.  Di Deng, PT, MA  #[id number]

## 2024-04-24 ENCOUNTER — THERAPY VISIT (OUTPATIENT)
Dept: PHYSICAL THERAPY | Facility: CLINIC | Age: 74
End: 2024-04-24
Attending: OBSTETRICS & GYNECOLOGY
Payer: MEDICARE

## 2024-04-24 DIAGNOSIS — N39.41 URGE INCONTINENCE OF URINE: Primary | ICD-10-CM

## 2024-04-24 PROCEDURE — 97535 SELF CARE MNGMENT TRAINING: CPT | Mod: GP | Performed by: PHYSICAL THERAPIST

## 2024-04-24 PROCEDURE — 97140 MANUAL THERAPY 1/> REGIONS: CPT | Mod: GP | Performed by: PHYSICAL THERAPIST

## 2024-05-26 ENCOUNTER — HEALTH MAINTENANCE LETTER (OUTPATIENT)
Age: 74
End: 2024-05-26

## 2024-06-10 ENCOUNTER — THERAPY VISIT (OUTPATIENT)
Dept: PHYSICAL THERAPY | Facility: CLINIC | Age: 74
End: 2024-06-10
Attending: OBSTETRICS & GYNECOLOGY
Payer: MEDICARE

## 2024-06-10 DIAGNOSIS — N39.41 URGE INCONTINENCE OF URINE: Primary | ICD-10-CM

## 2024-06-10 PROCEDURE — 97140 MANUAL THERAPY 1/> REGIONS: CPT | Mod: GP | Performed by: PHYSICAL THERAPIST

## 2024-06-10 PROCEDURE — 97110 THERAPEUTIC EXERCISES: CPT | Mod: GP | Performed by: PHYSICAL THERAPIST

## 2024-06-24 ENCOUNTER — THERAPY VISIT (OUTPATIENT)
Dept: PHYSICAL THERAPY | Facility: CLINIC | Age: 74
End: 2024-06-24
Attending: OBSTETRICS & GYNECOLOGY
Payer: MEDICARE

## 2024-06-24 DIAGNOSIS — N39.41 URGE INCONTINENCE OF URINE: Primary | ICD-10-CM

## 2024-06-24 PROCEDURE — 97112 NEUROMUSCULAR REEDUCATION: CPT | Mod: GP | Performed by: PHYSICAL THERAPIST

## 2024-06-24 PROCEDURE — 97110 THERAPEUTIC EXERCISES: CPT | Mod: GP | Performed by: PHYSICAL THERAPIST

## 2024-06-24 PROCEDURE — 97535 SELF CARE MNGMENT TRAINING: CPT | Mod: GP | Performed by: PHYSICAL THERAPIST

## 2024-06-24 NOTE — PROGRESS NOTES
Physical Therapy Progress and Recertification Note      [facility]                                                                                   OUTPATIENT PHYSICAL THERAPY    PLAN OF TREATMENT FOR OUTPATIENT REHABILITATION   Patient's Last Name, First Name, Arun Martin YOB: 1950   Provider's Name   PAULO [facility]   Medical Record No.  [id number]     Onset Date: 10/16/23  Start of Care Date: 11/17/23     Medical Diagnosis:  Urge incontinence of urine      PT Treatment Diagnosis:  Pelvic Floor Muscle Dysfunction Plan of Treatment  Frequency/Duration: 1x per week/ 8 weeks    Certification date from 05/21/24 to 07/16/24         See note for plan of treatment details and functional goals     Di Deng PT                         I CERTIFY THE NEED FOR THESE SERVICES FURNISHED UNDER        THIS PLAN OF TREATMENT AND WHILE UNDER MY CARE     (Physician attestation of this document indicates review and certification of the therapy plan).              Referring Provider:  Treasure Brink    Initial Assessment  See Epic Evaluation- Start of Care Date: 11/17/23            PLAN  Continue therapy per current plan of care.    Beginning/End Dates of Progress Note Reporting Period:  3/21/24 to 06/10/2024    Referring Provider:  Treasure Brink DO     06/10/24 0500   Appointment Info   Signing clinician's name / credentials Di Deng PT MA #[id number]   Total/Authorized Visits 8 (Medicare)   Visits Used 7   Medical Diagnosis Urge incontinence of urine   PT Tx Diagnosis Pelvic Floor Muscle Dysfunction   Quick Adds Pelvic Consent;Certification   Progress Note/Certification   Start of Care Date 11/17/23   Onset of illness/injury or Date of Surgery 10/16/23   Therapy Frequency 1x per week   Predicted Duration 8 weeks   Certification date from 05/21/24   Certification date to 07/16/24   Progress Note Due Date 07/16/24   Progress Note  "Completed Date 06/10/24   PT Goal 1   Goal Identifier STG   Goal Description 1)Pt will report void times consistently of 60 mins or longer, in 4 weeks.   Goal Progress Met: pt is getting up every 60 mins during the day, and 1-2x per nite.  (cont for 4 weeks)   Target Date 05/07/24   Date Met 04/24/24   PT Goal 2   Goal Identifier STG   Goal Description 2)Pt will report no leaking with 50% of urges in 4 weeks.   Goal Progress Met: pt states it has been at least a couple of weeks since her last leak.   Target Date 01/12/24   Date Met 01/10/24   PT Goal 3   Goal Identifier LTG   Goal Description 3)Pt will report void times of every 1.5 hours in 8 weeks.   Goal Progress Not Met: pt is not really following any schedule to \"go.\"  (cont x 6 weeks)   Target Date 07/02/24   PT Goal 4   Goal Identifier LTG   Goal Description 4)Pt will report no leaking with urges in 8 weeks.   Goal Progress Met: pt states \"I am not concerned about or bothered with any leaking.\"  States \"I am here to really try and get back my ability to climax.\"  (cont x 6 weeks)   Target Date 02/21/24   Date Met 04/09/24   PT Goal 5   Goal Identifier LTG   Goal Description 5)Pt will report ability to reach climax with clitoral stimulation in 8 weeks.   Goal Progress Not Met: pt states she is not able to have any orgasms with self stimulation/vibrator use.  (cont fo 8 weeks.)   Target Date 07/16/24   Subjective Report   Subjective Report Felt \"the start of something different\" after our last session where had more abdominal work.  States symptoms are still same that cannot get any arousal clitorally.   Objective Measure 1   Objective Measure Palpation   Details Decreased fascial mobility central to (R) LQ Abdomen. Iliopsoas tension   Objective Measure 2   Objective Measure Pain   Details Denies any pelvic pain   Objective Measure 3   Objective Measure Leaking   Details Denies leaking, says \"that is not my problem.\"   Therapeutic Procedure/Exercise " "  Therapeutic Procedures: strength, endurance, ROM, flexibility minutes (60337) 15   Ther Proc 1 - Details Pt had questions re: past low to mid back pain that is developing into a neck pain.  Suggested could be posture related so taught simple pectoral doorway stretch and chin tuck to ease in rounded shoulders and cervical kyphosis.  Pt additionally had c/o (L) knee pain today, so gave a HF stretch that will do double duty and allow release at the pelvis and for the distal portion at the knee. Taught modified kneeling HF stretch with foot up on chair instead of half kneel for lunge.   Skilled Intervention Exer: stretching, posture; progression of HEP   Patient Response/Progress Pt able to demo exer and points to stretch appropriately.   Manual Therapy   Manual Therapy: Mobilization, MFR, MLD, friction massage minutes (98600) 40   Manual Therapy 1 - Details Pt had questions again about the MFR so fully re-explained MFR and rationale for MFR as possible root cause of poor function at clitoris.  Pt in supported supine for full abdominal MFR, iliohypogastric release, and mobilization of the Urachus. Deep STM to the iliopsoas (B) medial to the ASIS (B).  Worked up to subcostal/respiratory diaphragm fascia, as felt restriction here while working lower to mid abdomen today.   Skilled Intervention MT: MFR, mobs and STM   Patient Response/Progress Pt states \"it feels like you are doing nothing.\"  No immediate changes reported.  Palpable releases in MFR at (R) LQ abdomen, in all planes   Plan   Home program PTRx   Updates to plan of care Forgot to print   Plan for next session See pt in 2 weeks.  Cont with MT prn.  Advance HEP.   Comments   Pelvic Health Informed Consent Statement Discussed with patient/guardian reason for referral regarding pelvic health needs and external/internal pelvic floor muscle examination.  Opportunity provided to ask questions and verbal consent for assessment and intervention was given.   Total " Session Time   Timed Code Treatment Minutes 55   Total Treatment Time (sum of timed and untimed services) 55   Thank you for the referral of this patient.  Di Deng, PT, MA  #[id number]

## 2024-08-21 ENCOUNTER — THERAPY VISIT (OUTPATIENT)
Dept: PHYSICAL THERAPY | Facility: CLINIC | Age: 74
End: 2024-08-21
Attending: OBSTETRICS & GYNECOLOGY
Payer: MEDICARE

## 2024-08-21 DIAGNOSIS — N39.41 URGE INCONTINENCE OF URINE: Primary | ICD-10-CM

## 2024-08-21 PROCEDURE — 90913 BFB TRAINING EA ADDL 15 MIN: CPT | Mod: GP | Performed by: PHYSICAL THERAPIST

## 2024-08-21 PROCEDURE — 97110 THERAPEUTIC EXERCISES: CPT | Mod: GP | Performed by: PHYSICAL THERAPIST

## 2024-08-21 PROCEDURE — 90912 BFB TRAINING 1ST 15 MIN: CPT | Mod: GP | Performed by: PHYSICAL THERAPIST

## 2024-10-09 ENCOUNTER — THERAPY VISIT (OUTPATIENT)
Dept: PHYSICAL THERAPY | Facility: CLINIC | Age: 74
End: 2024-10-09
Attending: OBSTETRICS & GYNECOLOGY
Payer: MEDICARE

## 2024-10-09 DIAGNOSIS — N39.41 URGE INCONTINENCE OF URINE: Primary | ICD-10-CM

## 2024-10-09 PROCEDURE — 90901 BIOFEEDBACK TRAIN ANY METH: CPT | Mod: GP | Performed by: PHYSICAL THERAPIST

## 2024-10-09 PROCEDURE — 97110 THERAPEUTIC EXERCISES: CPT | Mod: GP | Performed by: PHYSICAL THERAPIST

## 2024-10-09 PROCEDURE — 97535 SELF CARE MNGMENT TRAINING: CPT | Mod: GP | Performed by: PHYSICAL THERAPIST

## 2024-10-09 NOTE — PROGRESS NOTES
Physical Therapy Progress and Recertification Note      [facility]                                                                                   OUTPATIENT PHYSICAL THERAPY    PLAN OF TREATMENT FOR OUTPATIENT REHABILITATION   Patient's Last Name, First Name, Arun Martin YOB: 1950   Provider's Name   [facility]   Medical Record No.  [id number]     Onset Date: 10/16/23  Start of Care Date: 11/17/23     Medical Diagnosis:  Urge incontinence of urine      PT Treatment Diagnosis:  Pelvic Floor Muscle Dysfunction Plan of Treatment  Frequency/Duration: 1x per week/ (P) 12 weeks    Certification date from 07/16/24 to (P) 10/08/24         See note for plan of treatment details and functional goals     Di Deng, PT                         I CERTIFY THE NEED FOR THESE SERVICES FURNISHED UNDER        THIS PLAN OF TREATMENT AND WHILE UNDER MY CARE     (Physician attestation of this document indicates review and certification of the therapy plan).              Referring Provider:  Treasure Brink    Initial Assessment  See Epic Evaluation- Start of Care Date: 11/17/23            PLAN  Continue therapy per current plan of care.    Beginning/End Dates of Progress Note Reporting Period:  6/10/24 to 08/21/2024    Referring Provider:  Treasure Brink DO     08/21/24 0500   Appointment Info   Signing clinician's name / credentials Di Deng, PT MA #[id number]   Total/Authorized Visits 8 (Medicare)   Visits Used 8   Medical Diagnosis Urge incontinence of urine   PT Tx Diagnosis Pelvic Floor Muscle Dysfunction   Progress Note/Certification   Start of Care Date 11/17/23   Onset of illness/injury or Date of Surgery 10/16/23   Therapy Frequency 1x per week   Predicted Duration 12 weeks   Certification date from 07/16/24   Certification date to 10/08/24   Progress Note Due Date 10/08/24   Progress Note Completed Date 08/21/24   PT Goal 1    Goal Identifier STG   Goal Description 1)Pt will report void times consistently of 60 mins or longer, in 4 weeks.   Goal Progress Met: pt is getting up every 60 mins during the day, and 1-2x per nite.  (cont for 4 weeks)   Target Date 05/07/24   Date Met 04/24/24   PT Goal 2   Goal Identifier STG   Goal Description 2)Pt will report no leaking with 50% of urges in 4 weeks.   Goal Progress Met: pt states it has been at least a couple of weeks since her last leak.   Target Date 01/12/24   Date Met 01/10/24   PT Goal 3   Goal Identifier LTG   Goal Description 3)Pt will report void times of every 1.5 hours in 8 weeks.   Goal Progress Not Met: pt still not intentionally trying to void on a schedule.  (cont x 6 weeks)   Target Date 10/08/24   PT Goal 4   Goal Identifier LTG   Goal Description 4)Pt will report no leaking with urges in 8 weeks.   Goal Progress Not Met: pt states about 1x per week may get a dribble on the way to the bathroom.  (cont x 6 weeks)   Target Date 10/08/24   PT Goal 5   Goal Identifier LT   Goal Description 5)Pt will report ability to reach climax with clitoral stimulation in 8 weeks.   Goal Progress Not Met: pt met with MD again, but did not find the creams he rx as helpful. Will try OTC L-Arginine  (cont fo 8 weeks.)   Target Date 10/08/24   Subjective Report   Subjective Report Pt brought in her 2 meds for application at the clitoris.  No real changes in symptoms or lack of clitoral excitability.   Objective Measure 1   Objective Measure Void Times   Details Able to hold urine 2 hours this morning after drinking a latte, but had a pretty good urge by the time she actually went.   Objective Measure 2   Objective Measure Medications   Details Nifedipine/Arginine/Theophylline and L-Arginine/ Pentoxifilline/SI   Objective Measure 3   Objective Measure Leaking   Details Little tiny bit a couple of weeks ago, with an urge getting to bathroom.  Urges are sudden and can be hard to hold.   Objective  Measure 4   Objective Measure Biofeedback   Details Max = 70uV during cough, Avg quick = 28uV, Avg hold = 11uV, rest = 3-5uV, and endurance = 4-5 sec.   Biofeedback   Pelvic Floor Muscles (PFM) Biofeedback 1st 15 Min ONLY (04149) 15   Pelvic Floor Muscles (PFM) Biofeedback Each Addl 15 Min (73292) 10   Treatment Detail Obtained consent for rx from pt after explaining procedure. Pt in supported supine position with abdominal and adhesive perianal electrodes placed. BF administered using the Mazoom device and LiveGO software. Pt guided through quick and hold contractions using BF to enhance quality and control of muscle contractions. Spent a majority of session trying to work on relaxing PFM piror to and after contractions. Verbally cued for relaxation in hips, knees and shoulders to promote lowest recordings.   Biofeedback dual channel   Muscle RA and PFM   Patient Response/Progress By end of session, pt was able to demo relaxation to 1.5uV. Did best with relaxation after multple repeated PFCs. Fatigue factor (?).   Therapeutic Procedure/Exercise   Therapeutic Procedures: strength, endurance, ROM, flexibility minutes (17314) 30   Ther Proc 1 - Details Reviewed entire HEP.  Had pt demo all stretching and PFCs. Guided through 3 floor pelvic elevators, and through fullest relaxation (cued for 5 sec rests), and taught full relaxation exer program.  Relaxation program included palm warming, diaphragm breathng, 4-7-8 breathing, submax CR and deep squats.   Skilled Intervention Exer: stretching, strengthening, full body relaxation; progression of HEP.   Patient Response/Progress Pt able to demo all exers correctly.  Did not see big benefit from relaxation exers yet.   Plan   Home program PTRx   Updates to plan of care Added relaxation exers and printed entire HEP sheet/packet for pt.   Plan for next session Cont, and see pt in 3-4 weeks. Possibly try training with BF again prn.   Comments   Pelvic Health Informed  Consent Statement Discussed with patient/guardian reason for referral regarding pelvic health needs and external/internal pelvic floor muscle examination.  Opportunity provided to ask questions and verbal consent for assessment and intervention was given.   Total Session Time   Timed Code Treatment Minutes 55   Total Treatment Time (sum of timed and untimed services) 55   Thank you for the referral of this patient.  Di Deng, PT, MA  #[id number]

## 2024-10-10 NOTE — PROGRESS NOTES
Physical Therapy Progress and Recertification Note      [facility]                                                                                   OUTPATIENT PHYSICAL THERAPY    PLAN OF TREATMENT FOR OUTPATIENT REHABILITATION   Patient's Last Name, First Name, Arun Martin YOB: 1950   Provider's Name   [facility]   Medical Record No.  [id number]     Onset Date: 10/16/23  Start of Care Date: 11/17/23     Medical Diagnosis:  Urge incontinence of urine      PT Treatment Diagnosis:  Pelvic Floor Muscle Dysfunction Plan of Treatment  Frequency/Duration: 1x per week/ (P) 8 weeks    Certification date from (P) 10/08/24 to (P) 12/04/24         See note for plan of treatment details and functional goals     Di Deng, PT                         I CERTIFY THE NEED FOR THESE SERVICES FURNISHED UNDER        THIS PLAN OF TREATMENT AND WHILE UNDER MY CARE     (Physician attestation of this document indicates review and certification of the therapy plan).              Referring Provider:  Treasure Brink    Initial Assessment  See Epic Evaluation- Start of Care Date: 11/17/23            PLAN  Continue therapy per current plan of care.    Beginning/End Dates of Progress Note Reporting Period:  7/16/24 to 10/09/2024    Referring Provider:  Treasure Brink     10/09/24 0500   Appointment Info   Signing clinician's name / credentials Di Deng, PT MA #[id number]   Total/Authorized Visits 8 (Medicare)   Visits Used 9   Medical Diagnosis Urge incontinence of urine   PT Tx Diagnosis Pelvic Floor Muscle Dysfunction   Progress Note/Certification   Start of Care Date 11/17/23   Onset of illness/injury or Date of Surgery 10/16/23   Therapy Frequency 1x per week   Predicted Duration 8 weeks   Certification date from 10/08/24   Certification date to 12/04/24   Progress Note Due Date 12/04/24   Progress Note Completed Date 10/09/24   PT Goal 1  "  Goal Identifier STG   Goal Description 1)Pt will report void times consistently of 60 mins or longer, in 4 weeks.   Goal Progress Met: pt is getting up every 60 mins during the day, and 1-2x per nite.  (cont for 4 weeks)   Target Date 05/07/24   Date Met 04/24/24   PT Goal 2   Goal Identifier STG   Goal Description 2)Pt will report no leaking with 50% of urges in 4 weeks.   Goal Progress Met: pt states it has been at least a couple of weeks since her last leak.   Target Date 01/12/24   Date Met 01/10/24   PT Goal 3   Goal Identifier LTG   Goal Description 3)Pt will report void times of every 1.5 hours in 8 weeks.   Goal Progress Met: pt is voiding every 1.5-3 hours \"depends on the day, and the activity I am doing.\"  (cont x 6 weeks)   Target Date 10/08/24   Date Met 10/09/24   PT Goal 4   Goal Identifier LTG   Goal Description 4)Pt will report no leaking with urges in 8 weeks.   Goal Progress Not Met: pt states about 1x per every other week may get a dribble on the way to the bathroom.  (cont x 4 weeks)   Target Date 11/06/24   PT Goal 5   Goal Identifier LTG   Goal Description 5)Pt will report ability to reach climax with clitoral stimulation in 8 weeks.   Goal Progress Not Met: pt states no significant changes and is awaitting blood test results for testosterone.  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  Primary c/o today \"still lack of sensation in my genital area.\"   Sees her PCP tomorrow for results of blood tests (was getting testosterone measured). Secondarily, still getting some very infrequent leaking with an urge to go to the bathroom.   Objective Measure 1   Objective Measure Void Times   Details Going about every 1.5-3 hours.   Objective Measure 2   Objective Measure Fluid Intake   Details Black Tea in ams: 1 bag for about 3 cups, Coffee = 2c/day " "in am, Water = 8-10oz/day, Faith Tea = 10oz cup in evening; and takes \"a lot of pills\" in evening so takes water with these.  Between the Faith Tea the \"pill water\" is getting up 2x per nite.   Objective Measure 3   Objective Measure Leaking   Details Occasional leaking - \"dribble with a strong urge and on way to bathroom.\"  Not often, 1x per every couple of weeks.   Objective Measure 4   Objective Measure RUSI   Details TPUS: sagittal and coronal approaches for transperineal views.  Sagittal = visible elevation and visible bulge with cue to bear down.  Able to see \"wink\" of clitoris with contraction/relaxation in coronal (lollipop) view.   Biofeedback   Treatment Detail Pt positioned in supported supine hooklying position and appropriately draped for Rehabiltative US Imaging using transperineal approach (TPUS) in sagittal plane for viewing and training of movement in the Pelvic Floor Muscles. Guided pt through contract, relax and bulge motions, teaching pt anatomically what is supposed to elevate, and where we should see appropriate motions for relaxation and bulging. Attempted longer holds/endurance building contractions, but pt has only ability to hold for 2-3 seconds. Worked on various effort levels to see if could enhance the ability to hold.   Biofeedback   (RUSI TPUS approach)   Patient Response/Progress Poor ability to hold contractions > 3 seconds.   Biofeedback Training Minutes Untimed (65069) 10   Therapeutic Procedure/Exercise   Therapeutic Procedures: strength, endurance, ROM, flexibility minutes (08769) 10   Ther Proc 1 - Details Taught to try PFM contractions with the cues: \"Creep, creep, creep and hold for 3 seconds\". Each \"creep\" cue was meant to contract a little bit and then a little bit more. Similar to elevators, but a new way to think about the contractions.  Explained the clitoris needs a stable PFM, and this exer will help with this.   Skilled Intervention Exer: strengthening the \"slow " "twitch\" fibers.   Patient Response/Progress Pt able to demo 4-6 sec contractions of PFM using the above cues for holding and building the contractions.   Self Care/home Management   ADL/Home Mgmt Training (60131) 20   Self Care 1 - Details Brought awareness to importance of voiding routines that her bladder can come to rely on.  Suggested pt get a 2nd opinion on her hormone issue by going to the [facility].  Instructed in Void Times:  respecting that normal is every 2-4 hours.  Discussed possibility of stil being a hormone imbalance.  Pt shares that after went on Zoloft in 1997 she began ot have issues (side effects with HA and decreased libido).  Discussed the need for routine water intake to keep bladder from being irritated.   Skilled Intervention Self-care: void times, fluid intake, options for futher investigating hormone imbalances.   Patient Response/Progress Pt states \"my going times and water intake are so dependent upon what I am doing each day.\"   Plan   Home program PTRx   Updates to plan of care Try to add \"creep, creep, creep\" exer for improving endurance/holds.   Plan for next session Pt is on hold as she will be getting direction from her PCP, and possibly seeking a 2nd opinion   Total Session Time   Timed Code Treatment Minutes 30   Total Treatment Time (sum of timed and untimed services) 40   Thank you for the referral of this patient.  Di Deng, PT, MA  #[id number]    "

## 2024-12-30 PROBLEM — N39.41 URGE INCONTINENCE OF URINE: Status: RESOLVED | Noted: 2023-11-17 | Resolved: 2024-12-30

## 2025-04-12 ENCOUNTER — HEALTH MAINTENANCE LETTER (OUTPATIENT)
Age: 75
End: 2025-04-12

## 2025-06-14 ENCOUNTER — HEALTH MAINTENANCE LETTER (OUTPATIENT)
Age: 75
End: 2025-06-14